# Patient Record
Sex: FEMALE | Race: WHITE | Employment: OTHER | URBAN - METROPOLITAN AREA
[De-identification: names, ages, dates, MRNs, and addresses within clinical notes are randomized per-mention and may not be internally consistent; named-entity substitution may affect disease eponyms.]

---

## 2017-06-17 DIAGNOSIS — E03.4 HYPOTHYROIDISM DUE TO ACQUIRED ATROPHY OF THYROID: ICD-10-CM

## 2017-06-19 RX ORDER — LEVOTHYROXINE SODIUM 112 UG/1
TABLET ORAL
Qty: 90 TAB | Refills: 0 | Status: SHIPPED | OUTPATIENT
Start: 2017-06-19 | End: 2017-09-07 | Stop reason: SDUPTHER

## 2017-06-19 NOTE — TELEPHONE ENCOUNTER
She is due for lab appt to check annual fasting labs now.   Annual visit with MD or BOBBY is due in August.  Done for 90 since going to mail order but the lab appt needs to be now

## 2017-09-07 DIAGNOSIS — E03.4 HYPOTHYROIDISM DUE TO ACQUIRED ATROPHY OF THYROID: ICD-10-CM

## 2017-09-11 RX ORDER — LEVOTHYROXINE SODIUM 112 UG/1
TABLET ORAL
Qty: 90 TAB | Refills: 0 | Status: SHIPPED | OUTPATIENT
Start: 2017-09-11 | End: 2017-11-30 | Stop reason: SDUPTHER

## 2017-10-04 ENCOUNTER — OFFICE VISIT (OUTPATIENT)
Dept: FAMILY MEDICINE CLINIC | Age: 62
End: 2017-10-04

## 2017-10-04 VITALS
BODY MASS INDEX: 24.53 KG/M2 | HEART RATE: 70 BPM | DIASTOLIC BLOOD PRESSURE: 76 MMHG | OXYGEN SATURATION: 99 % | TEMPERATURE: 96.5 F | HEIGHT: 66 IN | WEIGHT: 152.6 LBS | RESPIRATION RATE: 16 BRPM | SYSTOLIC BLOOD PRESSURE: 107 MMHG

## 2017-10-04 DIAGNOSIS — Z23 ENCOUNTER FOR IMMUNIZATION: ICD-10-CM

## 2017-10-04 DIAGNOSIS — Z00.00 PHYSICAL EXAM: Primary | ICD-10-CM

## 2017-10-04 DIAGNOSIS — E78.00 PURE HYPERCHOLESTEROLEMIA: ICD-10-CM

## 2017-10-04 DIAGNOSIS — M41.25 OTHER IDIOPATHIC SCOLIOSIS, THORACOLUMBAR REGION: ICD-10-CM

## 2017-10-04 DIAGNOSIS — M85.80 OSTEOPENIA, UNSPECIFIED LOCATION: ICD-10-CM

## 2017-10-04 DIAGNOSIS — E03.9 HYPOTHYROIDISM, UNSPECIFIED TYPE: ICD-10-CM

## 2017-10-04 DIAGNOSIS — Z00.00 LABORATORY EXAM ORDERED AS PART OF ROUTINE GENERAL MEDICAL EXAMINATION: ICD-10-CM

## 2017-10-04 RX ORDER — CHOLECALCIFEROL (VITAMIN D3) 125 MCG
CAPSULE ORAL DAILY
COMMUNITY

## 2017-10-04 NOTE — PROGRESS NOTES
Chief Complaint   Patient presents with    Complete Physical    Labs     Fasting     1. Have you been to the ER, urgent care clinic since your last visit? Hospitalized since your last visit? No    2. Have you seen or consulted any other health care providers outside of the [address] [place] [address] since your last visit? Include any pap smears or colon screening. No     I have reviewed Health Maintenance with the patient and updated. Advance Care Planning information reviewed and given to the patient. The patient was counseled on the dangers of tobacco use, and Patient is a non smoker. .  Reviewed strategies to maximize success, including Continue not to smoke. Complete Physical Exam Female  Pre-Visit Questions:    1. Do you follow a low fat or low salt diet ? y  2. Do you follow an exercise program? y  3. Have you had your tetanus booster in the last 10 years? n  4. Have you ever had a Pneumonia vaccine? n  5. Do you smoke? n  6. Do you consider yourself overweight? n  7. Do you perform Breast self exam?y  8. Is there a family history of CAD< age 48? n  5. Is there a family history of Cancer? y  8. Do you have any Cancer risks? n  11. Have you had a colonoscopy? y  15. Have you been to your eye doctor past year?   y  15. Have you been to your dentist in the last 6 months? y   15. Have you had your flu shot for this season?  n  13. Have you had a Pap smear in the last 3 years?n  16. Have you had your annual mammogram?n  17. Have you had a bone density scan(DEXA)? Had a baseline done.

## 2017-10-04 NOTE — MR AVS SNAPSHOT
Visit Information Date & Time Provider Department Dept. Phone Encounter #  
 10/4/2017  8:20 AM Ashlyn Ruano MD [facility] [phone] [id number] Follow-up Instructions Return in about 1 year (around 10/4/2018) for [facility], labs. Upcoming Health Maintenance Date Due DTaP/Tdap/Td series (1 - Tdap) 10/5/1974 FOBT Q 1 YEAR AGE 50-75 10/5/2003 BREAST CANCER SCRN MAMMOGRAM 1/2/2018* PAP AKA CERVICAL CYTOLOGY 1/2/2018* *Topic was postponed. The date shown is not the original due date. Allergies as of 10/4/2017  Review Complete On: 10/4/2017 By: Ashlyn Ruano MD  
 No Known Allergies Current Immunizations  Reviewed on 8/2/2016 Name Date Tdap  Incomplete Zoster Vaccine, Live 11/7/2015 Not reviewed this visit You Were Diagnosed With   
  
 Codes Comments Physical exam    -  Primary ICD-10-CM: Z00.00 ICD-9-CM: V70.9 Other idiopathic scoliosis, thoracolumbar region     ICD-10-CM: M41.25 ICD-9-CM: 737.30 Osteopenia, unspecified location     ICD-10-CM: M85.80 ICD-9-CM: 733.90 Hypothyroidism, unspecified type     ICD-10-CM: E03.9 ICD-9-CM: 244.9 Pure hypercholesterolemia     ICD-10-CM: E78.00 ICD-9-CM: 272.0 Encounter for immunization     ICD-10-CM: N20 ICD-9-CM: V03.89 Laboratory exam ordered as part of routine general medical examination     ICD-10-CM: Z00.00 ICD-9-CM: V72.62 Vitals BP Pulse Temp Resp Height(growth percentile) Weight(growth percentile) 107/76 (BP 1 Location: Left arm, BP Patient Position: Sitting) 70 96.5 °F (35.8 °C) (Oral) 16 5' 6.25\" (1.683 m) 152 lb 9.6 oz (69.2 kg) SpO2 BMI OB Status Smoking Status 99% 24.44 kg/m2 Postmenopausal Never Smoker Vitals History BMI and BSA Data Body Mass Index Body Surface Area  
 24.44 kg/m 2 1.8 m 2 Preferred Pharmacy Pharmacy Name Phone [facility], [address] [place] [address] [address] Josefina Kulkarni Your Updated Medication List  
  
   
This list is accurate as of: 10/4/17  9:24 AM.  Always use your most recent med list.  
  
  
  
  
 levothyroxine 112 mcg tablet Commonly known as:  SYNTHROID Take 1 tablet by mouth  daily for thyroid  replacement VITAMIN D3 2,000 unit Tab Generic drug:  cholecalciferol (vitamin D3) Take  by mouth daily. We Performed the Following AMB POC EKG ROUTINE W/ 12 LEADS, INTER & REP [52243 CPT(R)] CBC WITH AUTOMATED DIFF [31260 CPT(R)] LIPID PANEL [97313 CPT(R)] METABOLIC PANEL, COMPREHENSIVE [04348 CPT(R)] OCCULT BLOOD, IMMUNOASSAY (FIT) [id number] CPT(R)] NV IMMUNIZ ADMIN,1 SINGLE/COMB VAC/TOXOID [id number] CPT(R)] T4, FREE [id number] CPT(R)] TETANUS, DIPHTHERIA TOXOIDS AND ACELLULAR PERTUSSIS VACCINE (TDAP), IN INDIVIDS. >=7, IM [id number] CPT(R)] TSH 3RD GENERATION [05924 CPT(R)] URINALYSIS W/ RFLX MICROSCOPIC [40846 CPT(R)] VITAMIN D, 25 HYDROXY [id number] CPT(R)] Follow-up Instructions Return in about 1 year (around 10/4/2018) for [facility], [facility]. Introducing [facility]! Jaiden Edgar introduces [employer] patient portal. Now you can access parts of your medical record, email your doctor's office, and request medication refills online. 1. In your internet browser, go to [email or web address] 2. Click on the First Time User? Click Here link in the Sign In box. You will see the New Member Sign Up page. 3. Enter your [employer] Access Code exactly as it appears below. You will not need to use this code after youve completed the sign-up process. If you do not sign up before the expiration date, you must request a new code. · [employer] Access Code: [email or web address] Expires: 1/2/2018  9:21 AM 
 
4.  Enter the last four digits of your Social Security Number (xxxx) and Date of Birth (mm/dd/yyyy) as indicated and click Submit. You will be taken to the next sign-up page. 5. Create a Online Prasad ID. This will be your Online Prasad login ID and cannot be changed, so think of one that is secure and easy to remember. 6. Create a Online Prasad password. You can change your password at any time. 7. Enter your Password Reset Question and Answer. This can be used at a later time if you forget your password. 8. Enter your e-mail address. You will receive e-mail notification when new information is available in [address] 9. Click Sign Up. You can now view and download portions of your medical record. 10. Click the Download Summary menu link to download a portable copy of your medical information. If you have questions, please visit the Frequently Asked Questions section of the Online Prasad website. Remember, Online [employer] is NOT to be used for urgent needs. For medical emergencies, dial 911. Now available from your iPhone and Android! Please provide this summary of care documentation to your next provider. Your primary care clinician is listed as [id number] If you have any questions after today's visit, please call [phone].

## 2017-10-04 NOTE — LETTER
10/5/2017 1:11 PM 
 
Ms. Meg Bonilla [address] [phone] Dear Meg Bonilla: Please find your most recent results below. Resulted Orders VITAMIN D, 25 HYDROXY Result Value Ref Range VITAMIN D, 25-HYDROXY 38.4 30.0 - 100.0 ng/mL Comment:  
   Vitamin D deficiency has been defined by the [address] practice guideline as a 
level of serum 25-OH vitamin D less than 20 ng/mL (1,2). The Endocrine Society went on to further define vitamin D 
insufficiency as a level between 21 and 29 ng/mL (2). 1. IOM ([place] of Medicine). 2010. Dietary reference 
   intakes for calcium and D. [facility]: The 
   [employer]. 2. Brittani MF, Wen LEBLANC, Richard GOLDBERG, et al. 
   Evaluation, treatment, and prevention of vitamin D 
   deficiency: an Endocrine Society clinical practice 
   guideline. JCEM. 2011 Jul; 96(7):1911-30. Narrative Performed at:  [address]  [id number] : Taylor Watson MD, Phone:  [phone] CBC WITH AUTOMATED DIFF Result Value Ref Range WBC 5.7 3.4 - 10.8 x10E3/uL  
 RBC 4.25 3.77 - 5.28 x10E6/uL HGB 13.4 11.1 - 15.9 g/dL HCT 39.7 34.0 - 46.6 % MCV 93 79 - 97 fL  
 MCH 31.5 26.6 - 33.0 pg  
 MCHC 33.8 31.5 - 35.7 g/dL  
 RDW 13.4 12.3 - 15.4 % PLATELET 013 194 - 733 x10E3/uL NEUTROPHILS 61 Not Estab. % Lymphocytes 27 Not Estab. % MONOCYTES 8 Not Estab. % EOSINOPHILS 3 Not Estab. % BASOPHILS 1 Not Estab. %  
 ABS. NEUTROPHILS 3.5 1.4 - 7.0 x10E3/uL Abs Lymphocytes 1.6 0.7 - 3.1 x10E3/uL  
 ABS. MONOCYTES 0.4 0.1 - 0.9 x10E3/uL  
 ABS. EOSINOPHILS 0.2 0.0 - 0.4 x10E3/uL  
 ABS. BASOPHILS 0.0 0.0 - 0.2 x10E3/uL IMMATURE GRANULOCYTES 0 Not Estab. %  
 ABS. IMM. GRANS. 0.0 0.0 - 0.1 x10E3/uL Narrative Performed at:  [address]  [id number] : Epi Coon MD, Phone:  [phone] URINALYSIS W/ RFLX MICROSCOPIC Result Value Ref Range Specific Gravity 1.019 1.005 - 1.030  
 pH (UA) 7.0 5.0 - 7.5 Color Yellow Yellow Appearance Clear Clear Leukocyte Esterase Negative Negative Protein Negative Negative/Trace Glucose Negative Negative Ketone Negative Negative Blood 3+ (A) Negative Bilirubin Negative Negative Urobilinogen 0.2 0.2 - 1.0 mg/dL Nitrites Negative Negative Microscopic Examination See additional order Comment:  
   Microscopic was indicated and was performed. Narrative Performed at:  [address]  [id number] : Epi Coon MD, Phone:  [phone] TSH 3RD GENERATION Result Value Ref Range TSH 0.529 0.450 - 4.500 uIU/mL Narrative Performed at:  [address]  [id number] : Epi Coon MD, Phone:  [phone] METABOLIC PANEL, COMPREHENSIVE Result Value Ref Range Glucose 89 65 - 99 mg/dL BUN 15 8 - 27 mg/dL Creatinine 0.81 0.57 - 1.00 mg/dL GFR est non-AA 78 >59 mL/min/1.73 GFR est AA 89 >59 mL/min/1.73  
 BUN/Creatinine ratio 19 12 - 28 Sodium 141 134 - 144 mmol/L Potassium 4.6 3.5 - 5.2 mmol/L Chloride 101 96 - 106 mmol/L  
 CO2 27 18 - 29 mmol/L Calcium 9.7 8.7 - 10.3 mg/dL Protein, total 7.1 6.0 - 8.5 g/dL Albumin 4.4 3.6 - 4.8 g/dL GLOBULIN, TOTAL 2.7 1.5 - 4.5 g/dL A-G Ratio 1.6 1.2 - 2.2 Bilirubin, total 0.9 0.0 - 1.2 mg/dL Alk. phosphatase 68 39 - 117 IU/L  
 AST (SGOT) 22 0 - 40 IU/L  
 ALT (SGPT) 20 0 - 32 IU/L Narrative Performed at:  [address]  [id number] : Epi Coon MD, Phone:  [phone] LIPID PANEL Result Value Ref Range Cholesterol, total 248 (H) 100 - 199 mg/dL Triglyceride 137 0 - 149 mg/dL HDL Cholesterol 75 >39 mg/dL VLDL, calculated 27 5 - 40 mg/dL LDL, calculated 146 (H) 0 - 99 mg/dL Narrative Performed at:  [address]  [id number] : Lauro Yanes MD, Phone:  [phone] T4, FREE Result Value Ref Range T4, Free 1.76 0.82 - 1.77 ng/dL Narrative Performed at:  [address]  [id number] : Lauro Yanes MD, Phone:  [phone] MICROSCOPIC EXAMINATION Result Value Ref Range WBC 0-5 0 - 5 /hpf  
 RBC 11-30 (A) 0 - 2 /hpf Epithelial cells 0-10 0 - 10 /hpf Casts None seen None seen /lpf Mucus Present Not Estab. Bacteria Few None seen/Few Narrative Performed at:  [address]  [id number] : Lauro Yanes MD, Phone:  [phone] CVD REPORT Result Value Ref Range INTERPRETATION Note Comment:  
   Supplement report is available. Narrative Performed at:  [address] [place] [address]  [id number] : Lucina Emanuel PhD, Phone:  [phone] RECOMMENDATIONS: 
Lipids slightly improved. Recommend getting heart scan to assess for Coronary Artery Disease. Hematuria. If there is no irritation present, I recommend referral to urology for evaluation. Please call me if you have any questions: [phone] Sincerely, Ashlyn Ruano MD

## 2017-10-04 NOTE — PROGRESS NOTES
HISTORY OF PRESENT ILLNESS  Felix Watson is a 59 y.o. female. HPI   Here for [facility]. Eye doctor past yr. Dentist 2 x annually. Colonoscopy '07. Knows due 10 yr repeat. Prefers FIT testing. Mammo '15. Scoliosis worse. Losing height. Pt will set up for both at [facility]. Pap ? Mliss Mcnally No signif hx past yr. Walks 2 miles daily. Does back exercises daily. Has CPAP but doesn't use.  states doesn't stop breathing anymore. May do home test.    Patient Active Problem List   Diagnosis Code    Pure hypercholesterolemia E78.00    Osteopenia M85.80    Hypothyroid E03.9    Scoliosis M41.9    Chronic cough R05     Current Outpatient Prescriptions   Medication Sig Dispense Refill    cholecalciferol, vitamin D3, (VITAMIN D3) 2,000 unit tab Take  by mouth daily.       levothyroxine (SYNTHROID) 112 mcg tablet Take 1 tablet by mouth  daily for thyroid  replacement 90 Tab 0     No Known Allergies  Past Medical History:   Diagnosis Date    Adhesive capsulitis of left shoulder     per 1/12/16 note    Advance directive discussed with patient 04/18/2016    Back pain     due to curvature           11/16/15 PT notes    Bilateral bunions     Bronchitis 3/8/16    [facility] notes  ?early pneumonia    Left shoulder pain 12/15/15    [facility]  [facility] 1/12/16 f/u note    Osteopenia     Palpitation     Pneumonia [id number]    Scoliosis     sees chiro    Thyroid disease     dr locke    Vitamin D deficiency 4/16/16    rx done for 3 months     Past Surgical History:   Procedure Laterality Date    BREAST SURGERY PROCEDURE UNLISTED      left  breast fibroid    ENDOSCOPY, COLON, DIAGNOSTIC      10/01/07 repeat 10 gelrud    HX HEENT      tonsil , septoplasty     Family History   Problem Relation Age of Onset    Lung Disease Mother     Cancer Mother      lung cancer-heavy smoker    Lung Disease Father     Cancer Father      lung cancer--heavy smoker    Alcohol abuse Father     Alcohol abuse Brother     Diabetes Maternal Grandmother     Diabetes Maternal Grandfather     Lung Disease Paternal Grandmother     Diabetes Paternal Grandfather     Other Brother      polio    Thyroid Disease Sister      Social History   Substance Use Topics    Smoking status: Never Smoker    Smokeless tobacco: Never Used    Alcohol use No      Lab Results  Component Value Date/Time   WBC 6.5 04/18/2016 11:42 AM   HGB 14.3 04/18/2016 11:42 AM   HCT 42.8 04/18/2016 11:42 AM   PLATELET 957 42/64/2087 11:42 AM   MCV 94 04/18/2016 11:42 AM     Lab Results  Component Value Date/Time   Glucose 88 04/18/2016 11:42 AM   LDL, calculated 155 04/18/2016 11:42 AM   Creatinine 0.72 04/18/2016 11:42 AM      Lab Results  Component Value Date/Time   Cholesterol, total 262 04/18/2016 11:42 AM   HDL Cholesterol 84 04/18/2016 11:42 AM   LDL, calculated 155 04/18/2016 11:42 AM   Triglyceride 117 04/18/2016 11:42 AM   Lab Results  Component Value Date/Time   ALT (SGPT) 22 04/18/2016 11:42 AM   AST (SGOT) 23 04/18/2016 11:42 AM   Alk. phosphatase 72 04/18/2016 11:42 AM   Bilirubin, total 0.9 04/18/2016 11:42 AM   Albumin 4.6 04/18/2016 11:42 AM   Protein, total 7.3 04/18/2016 11:42 AM   PLATELET 105 91/00/2690 11:42 AM       Lab Results  Component Value Date/Time   GFR est non-AA 90 04/18/2016 11:42 AM   GFR est  04/18/2016 11:42 AM   Creatinine 0.72 04/18/2016 11:42 AM   BUN 16 04/18/2016 11:42 AM   Sodium 139 04/18/2016 11:42 AM   Potassium 4.4 04/18/2016 11:42 AM   Chloride 97 04/18/2016 11:42 AM   CO2 25 04/18/2016 11:42 AM   Lab Results  Component Value Date/Time   TSH 0.864 04/18/2016 11:42 AM   T4, Free 1.69 04/18/2016 11:42 AM      Lab Results   Component Value Date/Time    Glucose 88 04/18/2016 11:42 AM         Review of Systems   Constitutional: Negative. HENT: Negative. Eyes: Negative. Respiratory: Negative. Cardiovascular: Positive for palpitations. Gastrointestinal: Negative. Genitourinary: Negative.     Musculoskeletal: Positive for back pain. Skin: Negative. Neurological: Negative. Endo/Heme/Allergies: Negative. Psychiatric/Behavioral: Negative. Physical Exam   Vitals:    10/04/17 0844   BP: 107/76   Pulse: 70   Resp: 16   Temp: 96.5 °F (35.8 °C)   TempSrc: Oral   SpO2: 99%   Weight: 152 lb 9.6 oz (69.2 kg)   Height: 5' 6.25\" (1.683 m)       General    alert, cooperative, no distress, appears stated age   Head    Normocephalic, without obvious abnormality, atraumatic   Eyes    conjunctivae/corneas clear. PERRL. Fundi benign   Ears    Canals and TMs clear   Nose Passages patent. Mucosa normal. No drainage or sinus tenderness. Throat Lips, mucosa, and tongue normal. Teeth and gums normal.  Post pharynx neg. Neck supple, nontender, no adenopathy, thyroid: not enlarged, no masses/nodules, no carotid bruits   Back     assymmetric, mod thoracolumbar curvature. FROM. No CVA tenderness   Lungs     clear to auscultation bilaterally   Breasts    No masses, NT, No d/c, No axillary adenopathy   Heart    Regular rate and rhythm, with no murmur, click, rub or gallop   Abdomen   Soft, non-tender. Bowel sounds normal. No masses,  No organomegaly   Genitalia and Rectal  Deferred. Extremities   extremities normal, atraumatic, no cyanosis or edema   Pulses   2+ and symmetric   Skin No rashes or lesions       Neurologic Romberg neg, nml heel, toe and Tandem gait. DTRs 2+ symmetric         ASSESSMENT and PLAN    ICD-10-CM ICD-9-CM    1. Physical exam Z00.00 V70.9 AMB POC EKG ROUTINE W/ 12 LEADS, INTER & REP      VITAMIN D, 25 HYDROXY      CBC WITH AUTOMATED DIFF      URINALYSIS W/ RFLX MICROSCOPIC      TSH 3RD GENERATION      METABOLIC PANEL, COMPREHENSIVE      LIPID PANEL      OCCULT BLOOD, IMMUNOASSAY (FIT)   2. Other idiopathic scoliosis, thoracolumbar region M41.25 737.30    3. Osteopenia, unspecified location M85.80 733.90 VITAMIN D, 25 HYDROXY      METABOLIC PANEL, COMPREHENSIVE   4.  Hypothyroidism, unspecified type E03.9 244.9 TSH 3RD GENERATION      T4, FREE   5. Pure hypercholesterolemia E78.00 272.0 LIPID PANEL   6. Encounter for immunization Z23 V03.89 TETANUS, DIPHTHERIA TOXOIDS AND ACELLULAR PERTUSSIS VACCINE (TDAP), IN INDIVIDS. >=7, IM      AR IMMUNIZ ADMIN,1 SINGLE/COMB VAC/TOXOID   7. Laboratory exam ordered as part of routine general medical examination Z00.00 V72.62 VITAMIN D, 25 HYDROXY      CBC WITH AUTOMATED DIFF      URINALYSIS W/ RFLX MICROSCOPIC      TSH 3RD GENERATION      METABOLIC PANEL, COMPREHENSIVE      LIPID PANEL      OCCULT BLOOD, IMMUNOASSAY (FIT)     Follow-up Disposition:  Return in about 1 year (around 10/4/2018) for [facility], labs.

## 2017-10-05 LAB
25(OH)D3+25(OH)D2 SERPL-MCNC: 38.4 NG/ML (ref 30–100)
ALBUMIN SERPL-MCNC: 4.4 G/DL (ref 3.6–4.8)
ALBUMIN/GLOB SERPL: 1.6 {RATIO} (ref 1.2–2.2)
ALP SERPL-CCNC: 68 IU/L (ref 39–117)
ALT SERPL-CCNC: 20 IU/L (ref 0–32)
APPEARANCE UR: CLEAR
AST SERPL-CCNC: 22 IU/L (ref 0–40)
BACTERIA #/AREA URNS HPF: ABNORMAL /[HPF]
BASOPHILS # BLD AUTO: 0 X10E3/UL (ref 0–0.2)
BASOPHILS NFR BLD AUTO: 1 %
BILIRUB SERPL-MCNC: 0.9 MG/DL (ref 0–1.2)
BILIRUB UR QL STRIP: NEGATIVE
BUN SERPL-MCNC: 15 MG/DL (ref 8–27)
BUN/CREAT SERPL: 19 (ref 12–28)
CALCIUM SERPL-MCNC: 9.7 MG/DL (ref 8.7–10.3)
CASTS URNS QL MICRO: ABNORMAL /LPF
CHLORIDE SERPL-SCNC: 101 MMOL/L (ref 96–106)
CHOLEST SERPL-MCNC: 248 MG/DL (ref 100–199)
CO2 SERPL-SCNC: 27 MMOL/L (ref 18–29)
COLOR UR: YELLOW
CREAT SERPL-MCNC: 0.81 MG/DL (ref 0.57–1)
EOSINOPHIL # BLD AUTO: 0.2 X10E3/UL (ref 0–0.4)
EOSINOPHIL NFR BLD AUTO: 3 %
EPI CELLS #/AREA URNS HPF: ABNORMAL /HPF
ERYTHROCYTE [DISTWIDTH] IN BLOOD BY AUTOMATED COUNT: 13.4 % (ref 12.3–15.4)
GLOBULIN SER CALC-MCNC: 2.7 G/DL (ref 1.5–4.5)
GLUCOSE SERPL-MCNC: 89 MG/DL (ref 65–99)
GLUCOSE UR QL: NEGATIVE
HCT VFR BLD AUTO: 39.7 % (ref 34–46.6)
HDLC SERPL-MCNC: 75 MG/DL
HGB BLD-MCNC: 13.4 G/DL (ref 11.1–15.9)
HGB UR QL STRIP: ABNORMAL
IMM GRANULOCYTES # BLD: 0 X10E3/UL (ref 0–0.1)
IMM GRANULOCYTES NFR BLD: 0 %
INTERPRETATION, 910389: NORMAL
KETONES UR QL STRIP: NEGATIVE
LDLC SERPL CALC-MCNC: 146 MG/DL (ref 0–99)
LEUKOCYTE ESTERASE UR QL STRIP: NEGATIVE
LYMPHOCYTES # BLD AUTO: 1.6 X10E3/UL (ref 0.7–3.1)
LYMPHOCYTES NFR BLD AUTO: 27 %
MCH RBC QN AUTO: 31.5 PG (ref 26.6–33)
MCHC RBC AUTO-ENTMCNC: 33.8 G/DL (ref 31.5–35.7)
MCV RBC AUTO: 93 FL (ref 79–97)
MICRO URNS: ABNORMAL
MONOCYTES # BLD AUTO: 0.4 X10E3/UL (ref 0.1–0.9)
MONOCYTES NFR BLD AUTO: 8 %
MUCOUS THREADS URNS QL MICRO: PRESENT
NEUTROPHILS # BLD AUTO: 3.5 X10E3/UL (ref 1.4–7)
NEUTROPHILS NFR BLD AUTO: 61 %
NITRITE UR QL STRIP: NEGATIVE
PH UR STRIP: 7 [PH] (ref 5–7.5)
PLATELET # BLD AUTO: 270 X10E3/UL (ref 150–379)
POTASSIUM SERPL-SCNC: 4.6 MMOL/L (ref 3.5–5.2)
PROT SERPL-MCNC: 7.1 G/DL (ref 6–8.5)
PROT UR QL STRIP: NEGATIVE
RBC # BLD AUTO: 4.25 X10E6/UL (ref 3.77–5.28)
RBC #/AREA URNS HPF: ABNORMAL /HPF
SODIUM SERPL-SCNC: 141 MMOL/L (ref 134–144)
SP GR UR: 1.02 (ref 1–1.03)
T4 FREE SERPL-MCNC: 1.76 NG/DL (ref 0.82–1.77)
TRIGL SERPL-MCNC: 137 MG/DL (ref 0–149)
TSH SERPL DL<=0.005 MIU/L-ACNC: 0.53 UIU/ML (ref 0.45–4.5)
UROBILINOGEN UR STRIP-MCNC: 0.2 MG/DL (ref 0.2–1)
VLDLC SERPL CALC-MCNC: 27 MG/DL (ref 5–40)
WBC # BLD AUTO: 5.7 X10E3/UL (ref 3.4–10.8)
WBC #/AREA URNS HPF: ABNORMAL /HPF

## 2017-10-05 NOTE — PROGRESS NOTES
Letter sent to patient with recommendations inserted per Dr. Soni Saliva: Lipids slightly improved. Recommend getting heart scan to assess Coronary Artery Disease. Hematuria.  If no irritation I recommend referral to urology for evaluation

## 2017-10-05 NOTE — PROGRESS NOTES
Lipids slt improved. Rec get heart scan to assess CAD risk. Hematuria.   If no irritation rec ref to [facility] for eval.

## 2017-10-25 DIAGNOSIS — M81.0 POSTMENOPAUSAL BONE LOSS: ICD-10-CM

## 2017-10-25 DIAGNOSIS — M85.80 OSTEOPENIA, UNSPECIFIED LOCATION: Primary | ICD-10-CM

## 2017-10-25 DIAGNOSIS — Z12.31 ENCOUNTER FOR SCREENING MAMMOGRAM FOR BREAST CANCER: ICD-10-CM

## 2017-10-26 LAB — HEMOCCULT STL QL IA: NEGATIVE

## 2017-11-01 NOTE — PROGRESS NOTES
Spoke with pt about her results per Dr. Shazia Kothari, \"Neg stool test.  Repeat 1 yr\". Pt verbalized understanding with no further questions.

## 2017-11-09 ENCOUNTER — HOSPITAL ENCOUNTER (OUTPATIENT)
Dept: MAMMOGRAPHY | Age: 62
Discharge: HOME | End: 2017-11-09
Attending: FAMILY MEDICINE
Payer: COMMERCIAL

## 2017-11-09 DIAGNOSIS — Z12.31 ENCOUNTER FOR SCREENING MAMMOGRAM FOR BREAST CANCER: ICD-10-CM

## 2017-11-09 DIAGNOSIS — M81.0 POSTMENOPAUSAL BONE LOSS: ICD-10-CM

## 2017-11-09 DIAGNOSIS — M85.80 OSTEOPENIA, UNSPECIFIED LOCATION: ICD-10-CM

## 2017-11-09 PROCEDURE — 77080 DXA BONE DENSITY AXIAL: CPT

## 2017-11-09 PROCEDURE — 77067 SCR MAMMO BI INCL CAD: CPT

## 2018-01-30 ENCOUNTER — TELEPHONE (OUTPATIENT)
Dept: FAMILY MEDICINE CLINIC | Age: 63
End: 2018-01-30

## 2018-01-30 NOTE — TELEPHONE ENCOUNTER
Patient calling to request a referral to see a Physical Therapist. His name is Kapil Rondon of [facility] . [address] , [place], [address] [place] [address].

## 2018-02-20 DIAGNOSIS — M54.9 CHRONIC BACK PAIN, UNSPECIFIED BACK LOCATION, UNSPECIFIED BACK PAIN LATERALITY: Primary | ICD-10-CM

## 2018-02-20 DIAGNOSIS — G89.29 CHRONIC BACK PAIN, UNSPECIFIED BACK LOCATION, UNSPECIFIED BACK PAIN LATERALITY: Primary | ICD-10-CM

## 2018-06-04 ENCOUNTER — OFFICE VISIT (OUTPATIENT)
Dept: FAMILY MEDICINE CLINIC | Age: 63
End: 2018-06-04

## 2018-06-04 VITALS
RESPIRATION RATE: 18 BRPM | DIASTOLIC BLOOD PRESSURE: 69 MMHG | TEMPERATURE: 96.8 F | OXYGEN SATURATION: 99 % | HEART RATE: 65 BPM | HEIGHT: 66 IN | SYSTOLIC BLOOD PRESSURE: 119 MMHG | WEIGHT: 153.9 LBS | BODY MASS INDEX: 24.73 KG/M2

## 2018-06-04 DIAGNOSIS — H93.13 TINNITUS OF BOTH EARS: Primary | ICD-10-CM

## 2018-06-04 DIAGNOSIS — B07.9 VIRAL WARTS, UNSPECIFIED TYPE: ICD-10-CM

## 2018-06-04 NOTE — PATIENT INSTRUCTIONS
1) Referral for podiatry:  Dr. Kenneth Fisher DPM  Address: [facility], [facility], [address] [facility]  Phone: [phone]    2) You have been referred to Dr. More Said, Ears, Nose and Throat (ENT) specialist.  Please call his office and schedule an appointment. [address] [place] [address]  Ph: [phone]    3) Osteopenia, (thinning of the bones) is not as severe as osteoporosis (bone loss). However, to reduce risk of progression to osteoporosis, calcium and Vitamin D supplements as well as daily weight bearing exercise (30-60 minutes/day) are recommended. Please start taking Calcium (1,200mg) and Vitamin D - (1,000 international units (IU) supplements daily. You may take individual supplements or a combination supplement as long as you will be getting the required daily amount. (You may have to take some supplements two times a day to get the appropriate amount.)  The goal is to get 1,000IU Vitamin D and 1,200mg Calcium a day. Vitamin D3 (Cholecalciferol) is best for Vitamin D supplementation. Weight bearing exercises are recommended to help stimulate bone formation as well. Weight bearing exercises include activities such as walking, tennis, aerobics, dancing and hiking. Do not smoke, as this contributes to bone loss. Additionally, alcohol has negative effects on bone health, so stop or limit alcohol consumption. Regular dental visits are also important as dental health can be impacted by bone loss. We will retest your bone density in 2-3 years.

## 2018-06-04 NOTE — PROGRESS NOTES
S: Gabriel Liang is a 57 y.o. female who presents for skin lesion    Assessment/Plan:  1. Tinnitus of both ears  -referral to ENT - Dr. Jamie Zimmer    2. Left foot lesions  -mid forefoot = 2mm hardened nodule w/white center  -mid heel = 1cm circular nodule w/hypopigmentation  -referral to poditry    3. Osteoporosis  -discussed Dexa scan results and pro/con of bisphosphonate medications   -pt has declined to get fosamax d/t SE she read about         HPI:    Bone health  1000 Ca + D3 2000 units daily  Didn't want to get fosamax last year dt SE - francisco jaw necrosis  Explained Dexa results and purpose of medications    Left foot   Mid foot - plantar wart  Mid heel - hardened callus    Ringing in ears - constant for past month  No dizziness  No vision changes or HA   No balance issues  No illnesses, but did think she might have had allergies -  currently not feeling well    Social History:  Nutrition: overall very healthy. Gets a lot of calcium (Ca calculator given)  Physical: very active - hikes, walks  Social: lives with   Occupation: self employed  Tobacco: none    Review of Systems:  - Constitutional Symptoms: no fatigue  - Cardiovascular: no chest pain or palpitations  - Respiratory: no cough or shortness of breath  - Gastrointestinal: no dysphagia or abdominal pain  - Musculoskeletal: no joint pains or weakness  - Neurological: no numbness, tingling    No LMP recorded.  Patient is postmenopausal.  Birth Control:     I reviewed the following:  Past Medical History:   Diagnosis Date    Adhesive capsulitis of left shoulder     per 1/12/16 note    Advance directive discussed with patient 04/18/2016    Back pain     due to curvature           11/16/15 PT notes    Bilateral bunions     Bronchitis 3/8/16    [facility] notes  ?early pneumonia    Left shoulder pain 12/15/15    Advanced  Herring 1/12/16 f/u note    Osteopenia     Palpitation     Pneumonia [id number]    Scoliosis     sees chiro    Thyroid disease     dr locke    Vitamin D deficiency 4/16/16    rx done for 3 months       Current Outpatient Prescriptions   Medication Sig Dispense Refill    levothyroxine (SYNTHROID) 112 mcg tablet TAKE 1 TABLET BY MOUTH  DAILY FOR THYROID  REPLACEMENT 90 Tab 3    cholecalciferol, vitamin D3, (VITAMIN D3) 2,000 unit tab Take  by mouth daily. No Known Allergies      O: VS:   Visit Vitals    /69 (BP 1 Location: Left arm, BP Patient Position: Sitting)    Pulse 65    Temp 96.8 °F (36 °C) (Oral)    Resp 18    Ht 5' 6.25\" (1.683 m)    Wt 153 lb 14.4 oz (69.8 kg)    SpO2 99%    BMI 24.65 kg/m2       GENERAL: Brea Norwood is in no acute distress. Non-toxic. Well nourished. Well developed. Appropriately groomed. HEAD:  Normocephalic. Atraumatic. Non tender sinuses x 4. EYE: PERRLA. EOMs intact. Sclera anicteric without injection. No drainage or discharge. EARS: Hearing intact bilaterally. External ear canals normal without evidence of blood or swelling. Bilateral TM's intact, pearly grey with landmarks visible. No erythema or effusion. NOSE: Patent. Nasal turbinates pink. No polyps noted. No erythema. No discharge. MOUTH: mucous membranes pink and moist. Posterior pharynx normal with cobblestone appearance. No erythema, white exudate or obstruction. Tonsils absent. NECK: supple. Midline trachea. No cervical adenopathy noted. RESP: Breath sounds are symmetrical bilaterally. Unlabored without SOB. Speaking in full sentences. Clear to auscultation bilaterally anteriorly and posteriorly. No wheezes. No rales or rhonchi. CV: normal rate. Regular rhythm. S1, S2 audible. No murmur noted. No rubs, clicks or gallops noted. ABDOMEN: Flat without bulges or pulsations. Soft and nondistended. No tenderness on palpation. No masses or organomegaly. No rebound, rigidity or guarding. Bowel sounds normal x 4 quadrants. SKIN: Skin is warm and dry. Turgor is normal. No cyanosis.  No jaundice or pallor. Left foot: mid forefoot = 2mm hardened nodule w/white center  Mid heel = 1cm circular nodule w/hypopigmentation  Lesion: No edema, erythema or purulent drainage noted. No induration or fluctuance noted. No adjacent lymph node involvement noted. PSYCH: appropriate behavior, dress and thought processes. Good eye contact. Clear and coherent speech. Full affect. Good insight.   ______________________________________________________________________  Patient education was done. Advised on nutrition, tobacco, and alcohol. Counseling included discussion of diagnosis, differentials, treatment options, prescribed treatment, warning signs and follow up. Medication risks/benefits, interactions and alternatives discussed with patient.      Patient verbalized understanding and agreed to plan of care.     Follow up with podiatry for warts and 1 1/2 yr for Dexa

## 2018-06-04 NOTE — PROGRESS NOTES
Chief Complaint   Patient presents with    Warts     lefty foot     Ringing in Ear     x 1 months       Vasquez Primes  Identified pt with two pt identifiers(name and ). Chief Complaint   Patient presents with    Warts     lefty foot     Ringing in Ear     x 1 months       1. Have you been to the ER, urgent care clinic since your last visit? N   Hospitalized since your last visit? n      2. Have you seen or consulted any other health care providers outside of the [address] [place] [address] since your last visit? Include any pap smears or colon screening. Yes/No    Today's provider has been notified of reason for visit, vitals and flowsheets obtained on patients.      Patient received paperwork for advance directive during previous visit but has not completed at this time     Reviewed record In preparation for visit, huddled with provider and have obtained necessary documentation      Health Maintenance Due   Topic    PAP AKA CERVICAL CYTOLOGY        Wt Readings from Last 3 Encounters:   18 153 lb 14.4 oz (69.8 kg)   10/04/17 152 lb 9.6 oz (69.2 kg)   16 160 lb (72.6 kg)     Temp Readings from Last 3 Encounters:   18 96.8 °F (36 °C) (Oral)   10/04/17 96.5 °F (35.8 °C) (Oral)   16 97.5 °F (36.4 °C) (Oral)     BP Readings from Last 3 Encounters:   18 119/69   10/04/17 107/76   16 98/64     Pulse Readings from Last 3 Encounters:   18 65   10/04/17 70   16 (!) 117     Vitals:    18 1633   BP: 119/69   Pulse: 65   Resp: 18   Temp: 96.8 °F (36 °C)   TempSrc: Oral   SpO2: 99%   Weight: 153 lb 14.4 oz (69.8 kg)   Height: 5' 6.25\" (1.683 m)         Learning Assessment:  :     Learning Assessment 2014   PRIMARY LEARNER Patient   HIGHEST LEVEL OF EDUCATION - PRIMARY LEARNER  > 4 YEARS OF COLLEGE   BARRIERS PRIMARY LEARNER NONE   CO-LEARNER CAREGIVER No   PRIMARY LANGUAGE ENGLISH   LEARNER PREFERENCE PRIMARY DEMONSTRATION   ANSWERED BY patient   RELATIONSHIP SELF       Depression Screening:  :     PHQ over the last two weeks 6/4/2018   Little interest or pleasure in doing things Not at all   Feeling down, depressed or hopeless Not at all   Total Score PHQ 2 0       Fall Risk Assessment:  :     No flowsheet data found. Abuse Screening:  :     No flowsheet data found. ADL Screening:  :     ADL Assessment 6/4/2018   Feeding yourself No Help Needed   Getting from bed to chair No Help Needed   Getting dressed No Help Needed   Bathing or showering No Help Needed   Walk across the room (includes cane/walker) No Help Needed   Using the telphone No Help Needed   Taking your medications No Help Needed   Preparing meals No Help Needed   Managing money (expenses/bills) No Help Needed   Moderately strenuous housework (laundry) No Help Needed   Shopping for personal items (toiletries/medicines) No Help Needed   Shopping for groceries No Help Needed   Driving No Help Needed   Climbing a flight of stairs No Help Needed   Getting to places beyond walking distances No Help Needed                 Medication reconciliation up to date and corrected with patient at this time.

## 2018-06-04 NOTE — MR AVS SNAPSHOT
[address] [place] 
[address] [facility] [address] 
[phone] Patient: Lazaro Muñoz MRN: [id number] Visit Information Date & Time Provider Department Dept. Phone Encounter #  
 6/4/2018  4:20 PM [place] IVAN Khan [facility] [phone] [id number] Upcoming Health Maintenance Date Due  
 PAP AKA CERVICAL CYTOLOGY 8/1/2015 Influenza Age 5 to Adult 8/1/2018 FOBT Q 1 YEAR AGE 50-75 10/19/2018 BREAST CANCER SCRN MAMMOGRAM 11/9/2019 DTaP/Tdap/Td series (2 - Td) 10/4/2027 Allergies as of 6/4/2018  Review Complete On: 6/4/2018 By: Gerald Ospina LPN No Known Allergies Current Immunizations  Reviewed on 10/4/2017 Name Date Tdap 10/4/2017 Zoster Vaccine, Live 11/7/2015 Not reviewed this visit You Were Diagnosed With   
  
 Codes Comments Tinnitus of both ears    -  Primary ICD-10-CM: H93.13 
ICD-9-CM: 388.30 Viral warts, unspecified type     ICD-10-CM: B07.9 ICD-9-CM: 078.10 Vitals BP Pulse Temp Resp Height(growth percentile) Weight(growth percentile)  
 119/69 (BP 1 Location: Left arm, BP Patient Position: Sitting) 65 96.8 °F (36 °C) (Oral) 18 5' 6.25\" (1.683 m) 153 lb 14.4 oz (69.8 kg) SpO2 BMI OB Status Smoking Status 99% 24.65 kg/m2 Postmenopausal Never Smoker BMI and BSA Data Body Mass Index Body Surface Area  
 24.65 kg/m 2 1.81 m 2 Preferred Pharmacy Pharmacy Name Phone 305 [facility], [address] [facility] [address] [place] Your Updated Medication List  
  
   
This list is accurate as of 6/4/18  5:14 PM.  Always use your most recent med list.  
  
  
  
  
 levothyroxine 112 mcg tablet Commonly known as:  SYNTHROID  
TAKE 1 TABLET BY MOUTH  DAILY FOR THYROID  REPLACEMENT  
  
 VITAMIN D3 2,000 unit Tab Generic drug:  cholecalciferol (vitamin D3) Take  by mouth daily. We Performed the Following REFERRAL TO ENT-OTOLARYNGOLOGY [IEP35 Custom] REFERRAL TO PODIATRY [REF90 Custom] Referral Information Referral ID Referred By Referred To  
  
 [id number] MD Caleb Duncancholo [address] [place] [address] Phone: [phone] Fax: [phone] Visits Status Start Date End Date 1 New Request 6/4/18 6/4/19 If your referral has a status of pending review or denied, additional information will be sent to support the outcome of this decision. Referral ID Referred By Referred To  
 [id number] Fernando Euceda, [address] Phone: [phone] Fax: [phone] Visits Status Start Date End Date 1 New Request 6/4/18 6/4/19 If your referral has a status of pending review or denied, additional information will be sent to support the outcome of this decision. Patient Instructions 1) Referral for podiatry: 
Dr. Emma Escobedo, [facility] 
Address: [address] [place] [address] Phone: [phone] 
 
2) You have been referred to Dr. Martinez Nose, Ears, Nose and Throat (ENT) specialist.  Please call his office and schedule an appointment. Caleb Vo [phone] Ph: [phone] 
 
3) Osteopenia, (thinning of the bones) is not as severe as osteoporosis (bone loss). However, to reduce risk of progression to osteoporosis, calcium and Vitamin D supplements as well as daily weight bearing exercise (30-60 minutes/day) are recommended. Please start taking Calcium (1,200mg) and Vitamin D - (1,000 international units (IU) supplements daily. You may take individual supplements or a combination supplement as long as you will be getting the required daily amount.   (You may have to take some supplements two times a day to get the appropriate amount.)  The goal is to get 1,000IU Vitamin D and 1,200mg Calcium a day. Vitamin D3 (Cholecalciferol) is best for Vitamin D supplementation. Weight bearing exercises are recommended to help stimulate bone formation as well. Weight bearing exercises include activities such as walking, tennis, aerobics, dancing and hiking. Do not smoke, as this contributes to bone loss. Additionally, alcohol has negative effects on bone health, so stop or limit alcohol consumption. Regular dental visits are also important as dental health can be impacted by bone loss. We will retest your bone density in 2-3 years. Introducing [facility] & [facility]! [facility] introduces [employer] patient portal. Now you can access parts of your medical record, email your doctor's office, and request medication refills online. 1. In your internet browser, go to [email or web address] 2. Click on the First Time User? Click Here link in the Sign In box. You will see the New Member Sign Up page. 3. Enter your [employer] Access Code exactly as it appears below. You will not need to use this code after youve completed the sign-up process. If you do not sign up before the expiration date, you must request a new code. · [employer] Access Code: [email or web address] Expires: 9/2/2018  5:14 PM 
 
4. Enter the last four digits of your Social Security Number (xxxx) and Date of Birth (mm/dd/yyyy) as indicated and click Submit. You will be taken to the next sign-up page. 5. Create a [employer] ID. This will be your [employer] login ID and cannot be changed, so think of one that is secure and easy to remember. 6. Create a [employer] password. You can change your password at any time. 7. Enter your Password Reset Question and Answer. This can be used at a later time if you forget your password. 8. Enter your e-mail address. You will receive e-mail notification when new information is available in [address] 9. Click Sign Up. You can now view and download portions of your medical record. 10. Click the Download Summary menu link to download a portable copy of your medical information. If you have questions, please visit the Frequently Asked Questions section of the [employer] website. Remember, [employer] is NOT to be used for urgent needs. For medical emergencies, dial 911. Now available from your iPhone and Android! Please provide this summary of care documentation to your next provider. Your primary care clinician is listed as Sergio Patiño Dr. If you have any questions after today's visit, please call [phone].

## 2018-07-12 ENCOUNTER — OFFICE VISIT (OUTPATIENT)
Dept: FAMILY MEDICINE CLINIC | Age: 63
End: 2018-07-12

## 2018-07-12 VITALS
WEIGHT: 153 LBS | OXYGEN SATURATION: 98 % | SYSTOLIC BLOOD PRESSURE: 102 MMHG | DIASTOLIC BLOOD PRESSURE: 64 MMHG | HEART RATE: 67 BPM | RESPIRATION RATE: 19 BRPM | HEIGHT: 66 IN | BODY MASS INDEX: 24.59 KG/M2 | TEMPERATURE: 98.1 F

## 2018-07-12 DIAGNOSIS — R22.1 SENSATION OF LUMP IN THROAT: Primary | ICD-10-CM

## 2018-07-12 NOTE — MR AVS SNAPSHOT
Oscar Mata 
 
 
 [address] [facility] 
[address] [place] [address] 
[phone] Patient: Jason Waters MRN: [id number] QA:65/4/[id number] Visit Information Date & Time Provider Department Dept. Phone Encounter #  
 7/12/2018  3:00 PM Nichelle Kamara NP [facility] [phone] [id number] Follow-up Instructions Return if symptoms worsen or fail to improve. Upcoming Health Maintenance Date Due  
 PAP AKA CERVICAL CYTOLOGY 8/1/2015 Influenza Age 5 to Adult 8/1/2018 FOBT Q 1 YEAR AGE 50-75 10/19/2018 BREAST CANCER SCRN MAMMOGRAM 11/9/2019 DTaP/Tdap/Td series (2 - Td) 10/4/2027 Allergies as of 7/12/2018  Review Complete On: 7/12/2018 By: Gunner Denis LPN No Known Allergies Current Immunizations  Reviewed on 10/4/2017 Name Date Tdap 10/4/2017 Zoster Vaccine, Live 11/7/2015 Not reviewed this visit You Were Diagnosed With   
  
 Codes Comments Sensation of lump in throat    -  Primary ICD-10-CM: R22.1 ICD-9-CM: 784.99 Vitals BP Pulse Temp Resp Height(growth percentile) Weight(growth percentile) 102/64 (BP 1 Location: Left arm, BP Patient Position: Sitting) 67 98.1 °F (36.7 °C) (Oral) 19 5' 6.25\" (1.683 m) 153 lb (69.4 kg) SpO2 BMI OB Status Smoking Status 98% 24.51 kg/m2 Postmenopausal Never Smoker BMI and BSA Data Body Mass Index Body Surface Area 24.51 kg/m 2 1.8 m 2 Preferred Pharmacy Pharmacy Name Phone 305 [facility], [address] [facility] [address] Your Updated Medication List  
  
   
This list is accurate as of 7/12/18  4:05 PM.  Always use your most recent med list.  
  
  
  
  
 levothyroxine 112 mcg tablet Commonly known as:  SYNTHROID  
TAKE 1 TABLET BY MOUTH  DAILY FOR THYROID  REPLACEMENT  
  
 VITAMIN D3 2,000 unit Tab Generic drug:  cholecalciferol (vitamin D3) Take  by mouth daily. We Performed the Following REFERRAL TO GASTROENTEROLOGY [IHE36 Custom] Comments:  
 Please evaluate and treat for persistent throat soreness and lump sensation. ENT already evaluated. Thanks. Follow-up Instructions Return if symptoms worsen or fail to improve. Referral Information Referral ID Referred By Referred To  
  
 [id number] LYLE, [address]   
   [address] [address], [address] Visits Status Start Date End Date 1 New Request 7/12/18 7/12/19 If your referral has a status of pending review or denied, additional information will be sent to support the outcome of this decision. Patient Instructions Throat soreness - referral to GI Please make an appt with Dr. Cari Navarro. 1) For ringing in the ears, can try to dry up the sinus passages. Use an OTC decongestant nasal spray such as Flonase, Nasonex, or Rhinocort. These contain a steroid and will help reduce congestion. You can spray 2x in each nostril two times a day. Use the opposite hand of the nostril you are spraying and look down at your toes when you administer the nasal spray. This ensures the spray is applied to the nasal tissue properly. 2) One type of way to screen for colon cancer is to test the DNA of stool that looks for blood and certain gene changes sometimes found in colorectal cancer cell. Currently, the FDA-approved test is Cologuard®. A healthcare provider writes a prescription for the test and a testing kit is then mailed to your home. After providing a stool sample as directed, the patient mails the kit to the laboratory for testing. A stool DNA test may appeal to people who want to be screened, but dont want to undergo the usual preparation required for a colonoscopy and some other screening tests.   It is a non-invasive test to check for colorectal cancer and, if negative, is repeated every 3 years. No special diet or bowel preparation (no laxatives or enemas) is required for a stool DNA test. However, if the test does show a possible cancer or pre-cancer, the patient would then need a colonoscopy to confirm it, and possibly to remove any polyps. Not everybody can have this type of screening test.  It is only for people with an average risk for colorectal cancer. (If you have a personal history of pre-cancerous polyps, colorectal cancer or other risk factors, this test is not right for you.) 3) Dr. Sully Guzman  
[address] [place] [address] [address], [place], [address] [place] [address] Phone: [phone] Introducing [facility] & HEALTH SERVICES! Geo Espinoza introduces [email or web address] patient portal. Now you can access parts of your medical record, email your doctor's office, and request medication refills online. 1. In your internet browser, go to [email or web address] 2. Click on the First Time User? Click Here link in the Sign In box. You will see the New Member Sign Up page. 3. Enter your [email or web address] Access Code exactly as it appears below. You will not need to use this code after youve completed the sign-up process. If you do not sign up before the expiration date, you must request a new code. · [email or web address] Access Code: [email or web address] Expires: 9/2/2018  5:14 PM 
 
4. Enter the last four digits of your Social Security Number (xxxx) and Date of Birth (mm/dd/yyyy) as indicated and click Submit. You will be taken to the next sign-up page. 5. Create a [employer] ID. This will be your [email or web address] login ID and cannot be changed, so think of one that is secure and easy to remember. 6. Create a [employer] password. You can change your password at any time. 7. Enter your Password Reset Question and Answer. This can be used at a later time if you forget your password. 8. Enter your e-mail address.  You will receive e-mail notification when new information is available in [employer]. 9. Click Sign Up. You can now view and download portions of your medical record. 10. Click the Download Summary menu link to download a portable copy of your medical information. If you have questions, please visit the Frequently Asked Questions section of the [employer] website. Remember, [employer] is NOT to be used for urgent needs. For medical emergencies, dial 911. Now available from your iPhone and Android! Please provide this summary of care documentation to your next provider. Your primary care clinician is listed as [phone] If you have any questions after today's visit, please call [phone].

## 2018-07-12 NOTE — PROGRESS NOTES
Chief Complaint   Patient presents with    Follow-up     ENT referral for throat issue/discomfort remains       Michelle Gutierrez  Identified pt with two pt identifiers(name and ). Chief Complaint   Patient presents with    Follow-up     ENT referral for throat issue/discomfort remains       1. Have you been to the ER, urgent care clinic since your last visit?n  Hospitalized since your last visit? No    2. Have you seen or consulted any other health care providers outside of the [address] [place] [address] since your last visit?n  Include any pap smears or colon screening. No    Today's provider has been notified of reason for visit, vitals and flowsheets obtained on patients.        Reviewed record In preparation for visit, huddled with provider and have obtained necessary documentation      Health Maintenance Due   Topic    PAP AKA CERVICAL CYTOLOGY        Wt Readings from Last 3 Encounters:   18 153 lb (69.4 kg)   18 153 lb 14.4 oz (69.8 kg)   10/04/17 152 lb 9.6 oz (69.2 kg)     Temp Readings from Last 3 Encounters:   18 98.1 °F (36.7 °C) (Oral)   18 96.8 °F (36 °C) (Oral)   10/04/17 96.5 °F (35.8 °C) (Oral)     BP Readings from Last 3 Encounters:   18 102/64   18 119/69   10/04/17 107/76     Pulse Readings from Last 3 Encounters:   18 67   18 65   10/04/17 70     Vitals:    18 1515   BP: 102/64   Pulse: 67   Resp: 19   Temp: 98.1 °F (36.7 °C)   TempSrc: Oral   SpO2: 98%   Weight: 153 lb (69.4 kg)   Height: 5' 6.25\" (1.683 m)   PainSc:   0 - No pain         Learning Assessment:  :     Learning Assessment 2014   PRIMARY LEARNER Patient   HIGHEST LEVEL OF EDUCATION - PRIMARY LEARNER  > 4 YEARS OF COLLEGE   BARRIERS PRIMARY LEARNER NONE   CO-LEARNER CAREGIVER No   PRIMARY LANGUAGE ENGLISH   LEARNER PREFERENCE PRIMARY DEMONSTRATION   ANSWERED BY patient   RELATIONSHIP SELF       Depression Screening:  :     PHQ over the last two weeks 2018 Little interest or pleasure in doing things Not at all   Feeling down, depressed or hopeless Not at all   Total Score PHQ 2 0       Fall Risk Assessment:  :     No flowsheet data found. Abuse Screening:  :     No flowsheet data found. ADL Screening:  :     ADL Assessment 6/4/2018   Feeding yourself No Help Needed   Getting from bed to chair No Help Needed   Getting dressed No Help Needed   Bathing or showering No Help Needed   Walk across the room (includes cane/walker) No Help Needed   Using the telphone No Help Needed   Taking your medications No Help Needed   Preparing meals No Help Needed   Managing money (expenses/bills) No Help Needed   Moderately strenuous housework (laundry) No Help Needed   Shopping for personal items (toiletries/medicines) No Help Needed   Shopping for groceries No Help Needed   Driving No Help Needed   Climbing a flight of stairs No Help Needed   Getting to places beyond walking distances No Help Needed                 Medication reconciliation up to date and corrected with patient at this time.

## 2018-07-12 NOTE — PROGRESS NOTES
S: Meg Bonilla is a 57 y.o. female who presents for follow up fullness in throat    Assessment/Plan:   1.  Sensation of lump in throat  -pt went to Dr. Sam Sterling who looked in throat and did see some redness   -could be anxiety but d/t length of time experiencing sx will refer to GI - [facility]  -pt would also like to do Cologard when Medicare starts in Sept.          HPI:  Per pt = Went to Dr. Sam Sterling and didn't see any tumor in throat, but still feels soreness at back of throat - Dr. Sam Sterling states he saw some redness that could be esophagitis (have not received Dr. Erasto Arnett note for review)  Pt has had several friends get \"weird\" dx - including brother in law with pancreatic cancer and she is worried d/t full feeling in throat not going away    Social History:  Nutrition: overall healthy - no problem eating, but still feels lump    Review of Systems:  - Constitutional Symptoms: no fevers, chills, weight loss  - Cardiovascular: no chest pain or palpitations  - Respiratory: no cough or shortness of breath  - Gastrointestinal: no dysphagia or abdominal pain    PHQ over the last two weeks 7/12/2018   Little interest or pleasure in doing things Not at all   Feeling down, depressed or hopeless Not at all   Total Score PHQ 2 0       I reviewed the following:  Past Medical History:   Diagnosis Date    Adhesive capsulitis of left shoulder     per 1/12/16 note    Advance directive discussed with patient 04/18/2016    Back pain     due to curvature           11/16/15 PT notes    Bilateral bunions     Bronchitis 3/8/16    [place] [facility] notes  ?early pneumonia    Left shoulder pain 12/15/15    [facility] 1/12/16 f/u note    Osteopenia     Palpitation     Pneumonia [id number]    Scoliosis     sees chiro    Thyroid disease     dr locke    Vitamin D deficiency 4/16/16    rx done for 3 months       Current Outpatient Prescriptions   Medication Sig Dispense Refill    levothyroxine (SYNTHROID) 112 mcg tablet TAKE 1 TABLET BY MOUTH  DAILY FOR THYROID  REPLACEMENT 90 Tab 3    cholecalciferol, vitamin D3, (VITAMIN D3) 2,000 unit tab Take  by mouth daily. No Known Allergies     O: VS:   Visit Vitals    /64 (BP 1 Location: Left arm, BP Patient Position: Sitting)    Pulse 67    Temp 98.1 °F (36.7 °C) (Oral)    Resp 19    Ht 5' 6.25\" (1.683 m)    Wt 153 lb (69.4 kg)    SpO2 98%    BMI 24.51 kg/m2     GENERAL: Thai Valdes is in no acute distress. Non-toxic. Well nourished. Well developed. Appropriately groomed. HEAD:  Normocephalic. Atraumatic. Non tender sinuses x 4. EYE: PERRLA. No drainage or discharge. EARS: Hearing intact bilaterally. External ear canals normal without evidence of blood or swelling. Bilateral TM's intact, pearly grey with landmarks visible. No erythema or effusion. NOSE: Patent. No erythema. No discharge. MOUTH: mucous membranes pink and moist. Posterior pharynx normal with cobblestone appearance. No erythema, white exudate or obstruction. NECK: supple. Midline trachea. No cervical adenopathy noted. RESP: Breath sounds are symmetrical bilaterally. Unlabored without SOB. Speaking in full sentences. Clear to auscultation bilaterally anteriorly and posteriorly. No wheezes. No rales or rhonchi. CV: normal rate. Regular rhythm. S1, S2 audible. No murmur noted. No rubs, clicks or gallops noted. SKIN: Skin is warm and dry. Turgor is normal. No petechiae, no purpura, no rash. No cyanosis. No mottling, jaundice or pallor. PSYCH: appropriate behavior, dress and thought processes. Good eye contact. Clear and coherent speech. Full affect. Good insight.     ___________________________________________________________________  Patient education was done. Advised on nutrition, physical activity, weight management, tobacco, alcohol and safety.   Counseling included discussion of diagnosis, differentials, treatment options, prescribed treatment, warning signs and follow up. Medication risks/benefits, interactions and alternatives discussed with patient.      Patient verbalized understanding and agreed to plan of care. Patient was given an after visit summary which included current diagnoses, medications and vital signs. Follow up as directed.

## 2018-07-12 NOTE — PATIENT INSTRUCTIONS
Throat soreness - referral to GI  Please make an appt with Dr. Oj Moraes. 1) For ringing in the ears, can try to dry up the sinus passages. Use an OTC decongestant nasal spray such as Flonase, Nasonex, or Rhinocort. These contain a steroid and will help reduce congestion. You can spray 2x in each nostril two times a day. Use the opposite hand of the nostril you are spraying and look down at your toes when you administer the nasal spray. This ensures the spray is applied to the nasal tissue properly. 2) One type of way to screen for colon cancer is to test the DNA of stool that looks for blood and certain gene changes sometimes found in colorectal cancer cell. Currently, the FDA-approved test is Cologuard®. A healthcare provider writes a prescription for the test and a testing kit is then mailed to your home. After providing a stool sample as directed, the patient mails the kit to the laboratory for testing. A stool DNA test may appeal to people who want to be screened, but dont want to undergo the usual preparation required for a colonoscopy and some other screening tests. It is a non-invasive test to check for colorectal cancer and, if negative, is repeated every 3 years. No special diet or bowel preparation (no laxatives or enemas) is required for a stool DNA test. However, if the test does show a possible cancer or pre-cancer, the patient would then need a colonoscopy to confirm it, and possibly to remove any polyps. Not everybody can have this type of screening test.  It is only for people with an average risk for colorectal cancer.   (If you have a personal history of pre-cancerous polyps, colorectal cancer or other risk factors, this test is not right for you.)      3) Joce Garcia, [address]  Phone: [phone]

## 2018-07-17 ENCOUNTER — TELEPHONE (OUTPATIENT)
Dept: FAMILY MEDICINE CLINIC | Age: 63
End: 2018-07-17

## 2018-07-17 NOTE — TELEPHONE ENCOUNTER
Patient called in stating to use nasal spray , she understand to use it 2x a day, but for how many days?   P: [phone]

## 2018-08-27 DIAGNOSIS — Z12.11 SCREEN FOR COLON CANCER: Primary | ICD-10-CM

## 2018-09-06 ENCOUNTER — OFFICE VISIT (OUTPATIENT)
Dept: FAMILY MEDICINE CLINIC | Age: 63
End: 2018-09-06

## 2018-09-06 VITALS
HEART RATE: 75 BPM | SYSTOLIC BLOOD PRESSURE: 98 MMHG | BODY MASS INDEX: 24.23 KG/M2 | RESPIRATION RATE: 19 BRPM | WEIGHT: 150.8 LBS | DIASTOLIC BLOOD PRESSURE: 56 MMHG | HEIGHT: 66 IN | OXYGEN SATURATION: 98 % | TEMPERATURE: 97.8 F

## 2018-09-06 DIAGNOSIS — R09.89 GLOBUS SENSATION: Primary | ICD-10-CM

## 2018-09-06 NOTE — PROGRESS NOTES
S: Meg Bonilla is a 57 y.o. female who presents for follow up     Assessment/Plan:   1. Globus sensation  -pt was evaluated by ENT and  GI - no issues noted  -Dr. Morris Diaz suggested colonoscopy + endoscopy appt, encouraged pt to schedule  -advised stress relieving therapies- yoga, exercise - pt to monitor if this relieves sensation         HPI:  Sore throat is still persisting  Saw Dr. Sam Sterling - ENT in July and he saw redness in back of throat but no tumors  7/12/18 - pt returned here for sensation in throat still present and given referral to GI (Dr. Morris Diaz)  Saw Dr. Morris Diaz yesterday - suggested she schedule colonoscopy + endoscopy together  Not pain - but more like discomfort  No lateralization of the symptoms,   No dysphagia,   No odynophagia,   + weight loss (2 lbs since 6/4/18)  + change in voice - but thinks it may be allergies - no hoarseness  No presence of a neck or tonsillar mass, and unexplained cervical     Pt under stress, has anxiety d/t brother with pancreatic cancer - going to visit every 2 weeks;  thinking of retiring. Discussed anxiety and globus sensation. Pt to monitor    Social History:  Nutrition: overall healthy   Physical: walks, hikes - very active  Social: lives with   Occupation: self employed    History   Smoking Status    Never Smoker   Smokeless Tobacco    Never Used     History   Alcohol Use No     History   Drug Use No        Review of Systems:  - Constitutional Symptoms: no fevers, chills  - Cardiovascular: no chest pain or palpitations  - Respiratory: no cough or shortness of breath  - Gastrointestinal: no dysphagia or abdominal pain  ROS is negative otherwise.     PHQ over the last two weeks 7/12/2018   Little interest or pleasure in doing things Not at all   Feeling down, depressed, irritable, or hopeless Not at all   Total Score PHQ 2 0       I reviewed the following:  Past Medical History:   Diagnosis Date    Adhesive capsulitis of left shoulder     per 1/12/16 note    Advance directive discussed with patient 04/18/2016    Back pain     due to curvature           11/16/15 PT notes    Bilateral bunions     Bronchitis 3/8/16    [place] [facility] notes  ?early pneumonia    Left shoulder pain 12/15/15    [facility] 1/12/16 f/u note    Osteopenia     Palpitation     Pneumonia [id number]    Scoliosis     sees chiro    Thyroid disease     dr locke    Vitamin D deficiency 4/16/16    rx done for 3 months       Current Outpatient Prescriptions   Medication Sig Dispense Refill    levothyroxine (SYNTHROID) 112 mcg tablet TAKE 1 TABLET BY MOUTH  DAILY FOR THYROID  REPLACEMENT 90 Tab 3    cholecalciferol, vitamin D3, (VITAMIN D3) 2,000 unit tab Take  by mouth daily. No Known Allergies     O: VS:   Visit Vitals    BP 98/56 (BP 1 Location: Left arm, BP Patient Position: Sitting)    Pulse 75    Temp 97.8 °F (36.6 °C) (Oral)    Resp 19    Ht 5' 6.25\" (1.683 m)    Wt 150 lb 12.8 oz (68.4 kg)    SpO2 98%    BMI 24.16 kg/m2       GENERAL: Rodrigo Freeman is in no acute distress. Non-toxic. Well nourished. Well developed. Appropriately groomed. HEAD:  Normocephalic. Atraumatic. Non tender sinuses x 4. EYE: PERRLA. No drainage or discharge. EARS: Hearing intact bilaterally. External ear canals normal without evidence of blood or swelling. Bilateral TM's intact, pearly grey with landmarks visible. No erythema or effusion. Tympanosclerosis noted bilateral center of TM's. NOSE: Patent. No erythema. No discharge. MOUTH: mucous membranes pink and moist. Posterior pharynx normal with cobblestone appearance. No erythema, white exudate or obstruction. NECK: supple. Midline trachea. No cervical adenopathy noted. RESP: Breath sounds are symmetrical bilaterally. Unlabored without SOB. Speaking in full sentences. Clear to auscultation bilaterally anteriorly and posteriorly. No wheezes. No rales or rhonchi. CV: normal rate. Regular rhythm.  S1, S2 audible. No murmur noted. No rubs, clicks or gallops noted. PSYCH: appropriate behavior, dress and thought processes. Good eye contact. Clear and coherent speech. Full affect. Good insight.     ___________________________________________________________________  Patient education was done. Advised on nutrition, physical activity, weight management, tobacco, alcohol and safety. Counseling included discussion of diagnosis, differentials, treatment options, prescribed treatment, warning signs and follow up. Medication risks/benefits, interactions and alternatives discussed with patient.      Patient verbalized understanding and agreed to plan of care. Patient was given an after visit summary which included current diagnoses, medications and vital signs. Follow up as directed. There are no Patient Instructions on file for this visit.

## 2018-09-06 NOTE — MR AVS SNAPSHOT
[address] [place] 
[address] [place] [address] 
[phone] Patient: Sridhar Dietrich MRN: [id number] MUX:[birth year] Visit Information Date & Time Provider Department Dept. Phone Encounter #  
 9/6/2018  9:20 AM Bryanna Major NP [facility] [phone] [id number] Follow-up Instructions Return if symptoms worsen or fail to improve. Upcoming Health Maintenance Date Due  
 PAP AKA CERVICAL CYTOLOGY 8/1/2015 Influenza Age 5 to Adult 8/1/2018 FOBT Q 1 YEAR AGE 50-75 10/19/2018 BREAST CANCER SCRN MAMMOGRAM 11/9/2019 DTaP/Tdap/Td series (2 - Td) 10/4/2027 Allergies as of 9/6/2018  Review Complete On: 9/6/2018 By: Bryanna Major NP No Known Allergies Current Immunizations  Reviewed on 10/4/2017 Name Date Tdap 10/4/2017 Zoster Vaccine, Live 11/7/2015 Not reviewed this visit You Were Diagnosed With   
  
 Codes Comments Globus sensation    -  Primary ICD-10-CM: F45.8 ICD-9-CM: 626. 4 Vitals BP Pulse Temp Resp Height(growth percentile) Weight(growth percentile) 98/56 (BP 1 Location: Left arm, BP Patient Position: Sitting) 75 97.8 °F (36.6 °C) (Oral) 19 5' 6.25\" (1.683 m) 150 lb 12.8 oz (68.4 kg) SpO2 BMI OB Status Smoking Status 98% 24.16 kg/m2 Postmenopausal Never Smoker BMI and BSA Data Body Mass Index Body Surface Area  
 24.16 kg/m 2 1.79 m 2 Preferred Pharmacy Pharmacy Name Phone 305 [facility], [address] [place] [address] [address] Your Updated Medication List  
  
   
This list is accurate as of 9/6/18  9:48 AM.  Always use your most recent med list.  
  
  
  
  
 levothyroxine 112 mcg tablet Commonly known as:  SYNTHROID  
TAKE 1 TABLET BY MOUTH  DAILY FOR THYROID  REPLACEMENT  
  
 VITAMIN D3 2,000 unit Tab Generic drug:  cholecalciferol (vitamin D3) Take  by mouth daily. Follow-up Instructions Return if symptoms worsen or fail to improve. Patient Instructions 1) Schedule a colonoscopy and endoscopy with Dr. Sumit Linares. The endoscopy will determine if you have gastric reflux (GERD). Anxiety can also be a cause of a globus sensation (lump in throat). Try relaxation techniques (yoga, exercise,) to see if this helps. Upper GI Endoscopy: Before Your Procedure What is an upper GI endoscopy? An upper gastrointestinal (or GI) endoscopy is a test that allows your doctor to look at the inside of your esophagus, stomach, and the first part of your small intestine, called the duodenum. The esophagus is the tube that carries food to your stomach. The doctor uses a thin, lighted tube that bends. It is called an endoscope, or scope. The doctor puts the tip of the scope in your mouth and gently moves it down your throat. The scope is a flexible video camera. The doctor looks at a monitor (like a TV set or a computer screen) as he or she moves the scope. A doctor may do this test, which is also called a procedure, to look for ulcers, tumors, infection, or bleeding. It also can be used to look for signs of acid backing up into your esophagus. This is called gastroesophageal reflux disease, or GERD. The doctor can use the scope to take a sample of tissue for study (a biopsy). The doctor also can use the scope to take out growths or stop bleeding. Follow-up care is a key part of your treatment and safety. Be sure to make and go to all appointments, and call your doctor if you are having problems. It's also a good idea to know your test results and keep a list of the medicines you take. What happens before the procedure? 
 Preparing for the procedure 
  · Understand exactly what procedure is planned, along with the risks, benefits, and other options.   
 · Tell your doctors ALL the medicines, vitamins, supplements, and herbal remedies you take. Some of these can increase the risk of bleeding or interact with anesthesia.  
  · If you take blood thinners, such as warfarin (Coumadin), clopidogrel (Plavix), or aspirin, be sure to talk to your doctor. He or she will tell you if you should stop taking these medicines before your procedure. Make sure that you understand exactly what your doctor wants you to do.  
  · Your doctor will tell you which medicines to take or stop before your procedure. You may need to stop taking certain medicines a week or more before the procedure. So talk to your doctor as soon as you can.  
  · If you have an advance directive, let your doctor know. It may include a living will and a durable power of  for health care. Bring a copy to the hospital. If you don't have one, you may want to prepare one. It lets your doctor and loved ones know your health care wishes. Doctors advise that everyone prepare these papers before any type of surgery or procedure. Procedures can be stressful. This information will help you understand what you can expect. And it will help you safely prepare for your procedure. What happens on the day of the procedure? · Follow the instructions exactly about when to stop eating and drinking. If you don't, your procedure may be canceled. If your doctor told you to take your medicines on the day of the procedure, take them with only a sip of water.  
  · Take a bath or shower before you come in for your procedure. Do not apply lotions, perfumes, deodorants, or nail polish.  
  · Take off all jewelry and piercings. And take out contact lenses, if you wear them.  
 At the hospital or surgery center · Bring a picture ID.  
  · The test may take 15 to 30 minutes.  
  · The doctor may spray medicine on the back of your throat to numb it. You also will get medicine to prevent pain and to relax you.  
  · You will lie on your left side.  The doctor will put the scope in your mouth and toward the back of your throat. The doctor will tell you when to swallow. This helps the scope move down your throat. You will be able to breathe normally. The doctor will move the scope down your esophagus into your stomach. The doctor also may look at the duodenum.  
  · If your doctor wants to take a sample of tissue for a biopsy, he or she may use small surgical tools, which are put into the scope, to cut off some tissue. You will not feel a biopsy, if one is taken. The doctor also can use the tools to stop bleeding or to do other treatments, if needed.  
  · You will stay at the hospital or surgery center for 1 to 2 hours until the medicine you were given wears off. Going home · Be sure you have someone to drive you home. Anesthesia and pain medicine make it unsafe for you to drive.  
  · You will be given more specific instructions about recovering from your procedure. They will cover things like diet, wound care, follow-up care, driving, and getting back to your normal routine. When should you call your doctor? · You have questions or concerns.  
  · You don't understand how to prepare for your procedure.  
  · You become ill before the procedure (such as fever, flu, or a cold).  
  · You need to reschedule or have changed your mind about having the procedure. Where can you learn more? Go to http://shwetha-rachel.info/. Enter P790 in the search box to learn more about \"Upper GI Endoscopy: Before Your Procedure. \" Current as of: May 12, 2017 Content Version: 11.7 © 3548-6453 [employer]. Care instructions adapted under license by [employer] (which disclaims liability or warranty for this information). If you have questions about a medical condition or this instruction, always ask your healthcare professional. [place] [address] any warranty or liability for your use of this information. Learning About Colonoscopy What is a colonoscopy? A colonoscopy is a test (also called a procedure) that lets a doctor look inside your large intestine. The doctor uses a thin, lighted tube called a colonoscope. The doctor uses it to look for small growths called polyps, colon or rectal cancer (colorectal cancer), or other problems like bleeding. During the procedure, the doctor can take samples of tissue. The samples can then be checked for cancer or other conditions. The doctor can also take out polyps. How is colonoscopy done? This procedure is done in a doctor's office or a clinic or hospital. You will get medicine to help you relax and not feel pain. Some people find that they do not remember having the test because of the medicine. The doctor gently moves the colonoscope, or scope, through the colon. The scope is also a small video camera. It lets the doctor see the colon and take pictures. A colonoscopy usually takes 30 to 45 minutes. It may take longer if the doctor has to remove polyps. How do you prepare for the procedure? You need to clean out your colon before the procedure so the doctor can see all of your colon. You may start the cleaning process a day or two before the test. This depends on which \"colon prep\" your doctor recommends. To clean your colon, you stop eating solid foods and drink only clear liquids. You can have water, tea, coffee, clear juices, clear broths, flavored ice pops, and gelatin (such as Jell-O). Do not drink anything red or purple, such as grape juice or fruit punch. And do not eat red or purple foods, such as grape ice pops or cherry gelatin. The day or night before the procedure, you drink a large amount of a special liquid. This causes loose, frequent stools. You will go to the bathroom a lot. It is very important to drink all of the colon prep liquid. If you have problems drinking the liquid, call your doctor.  
For many people, the prep is worse than the test. It may be uncomfortable, and you may feel hungry on the clear liquid diet. Some people do not go to work or do their usual activities on the day of the prep. Arrange to have someone take you home after the test. 
What can you expect after a colonoscopy? The nurses will watch you for 1 to 2 hours until the medicines wear off. Then you can go home. You will need a ride. Your doctor will tell you when you can eat and do your usual activities. Your doctor will talk to you about when you will need your next colonoscopy. The results of your test and your risk for colorectal cancer will help your doctor decide how often you need to be checked. Follow-up care is a key part of your treatment and safety. Be sure to make and go to all appointments, and call your doctor if you are having problems. It's also a good idea to know your test results and keep a list of the medicines you take. Where can you learn more? Go to http://shwetha-rachel.info/. Enter O483 in the search box to learn more about \"Learning About Colonoscopy. \" Current as of: May 12, 2017 Content Version: 11.7 © 0556-4240 [employer]. Care instructions adapted under license by [employer] (which disclaims liability or warranty for this information). If you have questions about a medical condition or this instruction, always ask your healthcare professional. [address] any warranty or liability for your use of this information. Introducing [facility]! [facility] introduces Arcivr patient portal. Now you can access parts of your medical record, email your doctor's office, and request medication refills online. 1. In your internet browser, go to [email or web address] 2. Click on the First Time User? Click Here link in the Sign In box. You will see the New Member Sign Up page. 3. Enter your Arcivr Access Code exactly as it appears below.  You will not need to use this code after youve completed the sign-up process. If you do not sign up before the expiration date, you must request a new code. · Synchronized Access Code: [email or web address] Expires: 12/5/2018  9:48 AM 
 
4. Enter the last four digits of your Social Security Number (xxxx) and Date of Birth (mm/dd/yyyy) as indicated and click Submit. You will be taken to the next sign-up page. 5. Create a Synchronized ID. This will be your Synchronized login ID and cannot be changed, so think of one that is secure and easy to remember. 6. Create a Synchronized password. You can change your password at any time. 7. Enter your Password Reset Question and Answer. This can be used at a later time if you forget your password. 8. Enter your e-mail address. You will receive e-mail notification when new information is available in [address] 9. Click Sign Up. You can now view and download portions of your medical record. 10. Click the Download Summary menu link to download a portable copy of your medical information. If you have questions, please visit the Frequently Asked Questions section of the Synchronized website. Remember, Synchronized is NOT to be used for urgent needs. For medical emergencies, dial 911. Now available from your iPhone and Android! Please provide this summary of care documentation to your next provider. Your primary care clinician is listed as [id number] If you have any questions after today's visit, please call [phone].

## 2018-09-06 NOTE — PROGRESS NOTES
Chief Complaint   Patient presents with    Sore Throat     [facility] two pt identifiers(name and ). Chief Complaint   Patient presents with    Sore Throat     Rm 10       1. Have you been to the ER, urgent care clinic since your last visit? n Hospitalized since your last visit? n    2. Have you seen or consulted any other health care providers outside of the [address] [place] [address] since your last visit? Include any pap smears or colon screening. Advance Care Planning    In the event something were to happen to you and you were unable to speak on your behalf, do you have an Advance Directive/ Living Will in place stating your wishes? NO    If yes, do we have a copy on file NO:    If no, would you like information YES Book given    Medication reconciliation up to date and corrected with patient at this time. Today's provider has been notified of reason for visit, vitals and flowsheets obtained on patients. Reviewed record in preparation for visit, huddled with provider and have obtained necessary documentation.       Health Maintenance Due   Topic    PAP AKA CERVICAL CYTOLOGY     Influenza Age 5 to Adult     FOBT Q 1 YEAR AGE 50-75        Wt Readings from Last 3 Encounters:   18 150 lb 12.8 oz (68.4 kg)   18 153 lb (69.4 kg)   18 153 lb 14.4 oz (69.8 kg)     Temp Readings from Last 3 Encounters:   18 97.8 °F (36.6 °C) (Oral)   18 98.1 °F (36.7 °C) (Oral)   18 96.8 °F (36 °C) (Oral)     BP Readings from Last 3 Encounters:   18 98/56   18 102/64   18 119/69     Pulse Readings from Last 3 Encounters:   18 75   18 67   18 65     Vitals:    18 0922   BP: 98/56   Pulse: 75   Resp: 19   Temp: 97.8 °F (36.6 °C)   TempSrc: Oral   SpO2: 98%   Weight: 150 lb 12.8 oz (68.4 kg)   Height: 5' 6.25\" (1.683 m)   PainSc:   2         Learning Assessment:  :     Learning Assessment 2014 PRIMARY LEARNER Patient   HIGHEST LEVEL OF EDUCATION - PRIMARY LEARNER  > 4 YEARS OF COLLEGE   BARRIERS PRIMARY LEARNER NONE   CO-LEARNER CAREGIVER No   PRIMARY LANGUAGE ENGLISH   LEARNER PREFERENCE PRIMARY DEMONSTRATION   ANSWERED BY patient   RELATIONSHIP SELF       Depression Screening:  :     PHQ over the last two weeks 9/6/2018   Little interest or pleasure in doing things Not at all   Feeling down, depressed, irritable, or hopeless Not at all   Total Score PHQ 2 0       Fall Risk Assessment:  :     No flowsheet data found. Abuse Screening:  :     No flowsheet data found. ADL Screening:  :     ADL Assessment 6/4/2018   Feeding yourself No Help Needed   Getting from bed to chair No Help Needed   Getting dressed No Help Needed   Bathing or showering No Help Needed   Walk across the room (includes cane/walker) No Help Needed   Using the telphone No Help Needed   Taking your medications No Help Needed   Preparing meals No Help Needed   Managing money (expenses/bills) No Help Needed   Moderately strenuous housework (laundry) No Help Needed   Shopping for personal items (toiletries/medicines) No Help Needed   Shopping for groceries No Help Needed   Driving No Help Needed   Climbing a flight of stairs No Help Needed   Getting to places beyond walking distances No Help Needed                 Medication reconciliation up to date and corrected with patient at this time.

## 2018-09-06 NOTE — PATIENT INSTRUCTIONS
1) Schedule a colonoscopy and endoscopy with Dr. Chloe Mccall. The endoscopy will determine if you have gastric reflux (GERD). Anxiety can also be a cause of a globus sensation (lump in throat). Try relaxation techniques (yoga, exercise,) to see if this helps. Upper GI Endoscopy: Before Your Procedure  What is an upper GI endoscopy? An upper gastrointestinal (or GI) endoscopy is a test that allows your doctor to look at the inside of your esophagus, stomach, and the first part of your small intestine, called the duodenum. The esophagus is the tube that carries food to your stomach. The doctor uses a thin, lighted tube that bends. It is called an endoscope, or scope. The doctor puts the tip of the scope in your mouth and gently moves it down your throat. The scope is a flexible video camera. The doctor looks at a monitor (like a TV set or a computer screen) as he or she moves the scope. A doctor may do this test, which is also called a procedure, to look for ulcers, tumors, infection, or bleeding. It also can be used to look for signs of acid backing up into your esophagus. This is called gastroesophageal reflux disease, or GERD. The doctor can use the scope to take a sample of tissue for study (a biopsy). The doctor also can use the scope to take out growths or stop bleeding. Follow-up care is a key part of your treatment and safety. Be sure to make and go to all appointments, and call your doctor if you are having problems. It's also a good idea to know your test results and keep a list of the medicines you take. What happens before the procedure?   Preparing for the procedure    · Understand exactly what procedure is planned, along with the risks, benefits, and other options. · Tell your doctors ALL the medicines, vitamins, supplements, and herbal remedies you take.  Some of these can increase the risk of bleeding or interact with anesthesia.     · If you take blood thinners, such as warfarin (Coumadin), clopidogrel (Plavix), or aspirin, be sure to talk to your doctor. He or she will tell you if you should stop taking these medicines before your procedure. Make sure that you understand exactly what your doctor wants you to do.     · Your doctor will tell you which medicines to take or stop before your procedure. You may need to stop taking certain medicines a week or more before the procedure. So talk to your doctor as soon as you can.     · If you have an advance directive, let your doctor know. It may include a living will and a durable power of  for health care. Bring a copy to the hospital. If you don't have one, you may want to prepare one. It lets your doctor and loved ones know your health care wishes. Doctors advise that everyone prepare these papers before any type of surgery or procedure. Procedures can be stressful. This information will help you understand what you can expect. And it will help you safely prepare for your procedure. What happens on the day of the procedure? · Follow the instructions exactly about when to stop eating and drinking. If you don't, your procedure may be canceled. If your doctor told you to take your medicines on the day of the procedure, take them with only a sip of water.     · Take a bath or shower before you come in for your procedure. Do not apply lotions, perfumes, deodorants, or nail polish.     · Take off all jewelry and piercings. And take out contact lenses, if you wear them.    At the hospital or surgery center   · Bring a picture ID.     · The test may take 15 to 30 minutes.     · The doctor may spray medicine on the back of your throat to numb it. You also will get medicine to prevent pain and to relax you.     · You will lie on your left side. The doctor will put the scope in your mouth and toward the back of your throat. The doctor will tell you when to swallow. This helps the scope move down your throat.  You will be able to breathe normally. The doctor will move the scope down your esophagus into your stomach. The doctor also may look at the duodenum.     · If your doctor wants to take a sample of tissue for a biopsy, he or she may use small surgical tools, which are put into the scope, to cut off some tissue. You will not feel a biopsy, if one is taken. The doctor also can use the tools to stop bleeding or to do other treatments, if needed.     · You will stay at the hospital or surgery center for 1 to 2 hours until the medicine you were given wears off. Going home   · Be sure you have someone to drive you home. Anesthesia and pain medicine make it unsafe for you to drive.     · You will be given more specific instructions about recovering from your procedure. They will cover things like diet, wound care, follow-up care, driving, and getting back to your normal routine. When should you call your doctor? · You have questions or concerns.     · You don't understand how to prepare for your procedure.     · You become ill before the procedure (such as fever, flu, or a cold).     · You need to reschedule or have changed your mind about having the procedure. Where can you learn more? Go to http://shwetha-rachel.info/. Enter P790 in the search box to learn more about \"Upper GI Endoscopy: Before Your Procedure. \"  Current as of: May 12, 2017  Content Version: 11.7  © 0652-8360 [employer]. Care instructions adapted under license by [employer] (which disclaims liability or warranty for this information). If you have questions about a medical condition or this instruction, always ask your healthcare professional. [place] [address] any warranty or liability for your use of this information. Learning About Colonoscopy  What is a colonoscopy? A colonoscopy is a test (also called a procedure) that lets a doctor look inside your large intestine.  The doctor uses a thin, lighted tube called a colonoscope. The doctor uses it to look for small growths called polyps, colon or rectal cancer (colorectal cancer), or other problems like bleeding. During the procedure, the doctor can take samples of tissue. The samples can then be checked for cancer or other conditions. The doctor can also take out polyps. How is colonoscopy done? This procedure is done in a doctor's office or a clinic or hospital. You will get medicine to help you relax and not feel pain. Some people find that they do not remember having the test because of the medicine. The doctor gently moves the colonoscope, or scope, through the colon. The scope is also a small video camera. It lets the doctor see the colon and take pictures. A colonoscopy usually takes 30 to 45 minutes. It may take longer if the doctor has to remove polyps. How do you prepare for the procedure? You need to clean out your colon before the procedure so the doctor can see all of your colon. You may start the cleaning process a day or two before the test. This depends on which \"colon prep\" your doctor recommends. To clean your colon, you stop eating solid foods and drink only clear liquids. You can have water, tea, coffee, clear juices, clear broths, flavored ice pops, and gelatin (such as Jell-O). Do not drink anything red or purple, such as grape juice or fruit punch. And do not eat red or purple foods, such as grape ice pops or cherry gelatin. The day or night before the procedure, you drink a large amount of a special liquid. This causes loose, frequent stools. You will go to the bathroom a lot. It is very important to drink all of the colon prep liquid. If you have problems drinking the liquid, call your doctor. For many people, the prep is worse than the test. It may be uncomfortable, and you may feel hungry on the clear liquid diet. Some people do not go to work or do their usual activities on the day of the prep.   Arrange to have someone take you home after the test.  What can you expect after a colonoscopy? The nurses will watch you for 1 to 2 hours until the medicines wear off. Then you can go home. You will need a ride. Your doctor will tell you when you can eat and do your usual activities. Your doctor will talk to you about when you will need your next colonoscopy. The results of your test and your risk for colorectal cancer will help your doctor decide how often you need to be checked. Follow-up care is a key part of your treatment and safety. Be sure to make and go to all appointments, and call your doctor if you are having problems. It's also a good idea to know your test results and keep a list of the medicines you take. Where can you learn more? Go to http://shewtha-rachel.info/. Enter X192 in the search box to learn more about \"Learning About Colonoscopy. \"  Current as of: May 12, 2017  Content Version: 11.7  © 6035-9782 [employer]. Care instructions adapted under license by [employer] (which disclaims liability or warranty for this information). If you have questions about a medical condition or this instruction, always ask your healthcare professional. [address] any warranty or liability for your use of this information.

## 2018-10-03 ENCOUNTER — TELEPHONE (OUTPATIENT)
Dept: FAMILY MEDICINE CLINIC | Age: 63
End: 2018-10-03

## 2018-10-03 NOTE — TELEPHONE ENCOUNTER
Patient called into the office stating that a Physical therapy script was supposed to be faxed over to [facility], their fax number is [phone]  Patients Phone: [phone]

## 2018-10-03 NOTE — TELEPHONE ENCOUNTER
Writer called patient to get more information about the PT she is requesting. Writer reviewed chart and did not see where PT was discussed at last office visit at office with Gregorio Reed. Writer spoke with patient. Patient verified . Writer asked patient about the PT she is requesting. Writer stated to patient that after reviewing chart, I did not see where it was discussed. Patient stated that she has been doing it for awhile now, and she was that she was told to ask her PCP office to renew referral to keep doing PT for her chronic back pain. Writer verbalized understanding and stated that a message would be placed in with Dr. Jennifer Devi.

## 2018-10-04 NOTE — TELEPHONE ENCOUNTER
Writer faxed over request for PT recommendation to continue care. DR. Jose E Gordillo will re-submit referral based on PT recommendation. Awaiting response.   Nothing further

## 2019-04-01 DIAGNOSIS — E03.4 HYPOTHYROIDISM DUE TO ACQUIRED ATROPHY OF THYROID: ICD-10-CM

## 2019-04-03 NOTE — TELEPHONE ENCOUNTER
Requested Prescriptions     Pending Prescriptions Disp Refills    levothyroxine (SYNTHROID) 112 mcg tablet 90 Tab 0     Pharmacy is requesting a 90 day supply.

## 2019-04-03 NOTE — TELEPHONE ENCOUNTER
PCP: Samina Diallo MD    Last appt: 9/6/2018  No future appointments.     Requested Prescriptions     Pending Prescriptions Disp Refills    levothyroxine (SYNTHROID) 112 mcg tablet 90 Tab 0       Prior labs and Blood pressures:  BP Readings from Last 3 Encounters:   09/06/18 98/56   07/12/18 102/64   06/04/18 119/69     Lab Results   Component Value Date/Time    Sodium 141 10/04/2017 11:02 AM    Potassium 4.6 10/04/2017 11:02 AM    Chloride 101 10/04/2017 11:02 AM    CO2 27 10/04/2017 11:02 AM    Glucose 89 10/04/2017 11:02 AM    BUN 15 10/04/2017 11:02 AM    Creatinine 0.81 10/04/2017 11:02 AM    BUN/Creatinine ratio 19 10/04/2017 11:02 AM    GFR est AA 89 10/04/2017 11:02 AM    GFR est non-AA 78 10/04/2017 11:02 AM    Calcium 9.7 10/04/2017 11:02 AM     No results found for: HBA1C, HGBE8, [id number], [id number]  Lab Results   Component Value Date/Time    Cholesterol, total 248 (H) 10/04/2017 11:02 AM    HDL Cholesterol 75 10/04/2017 11:02 AM    LDL, calculated 146 (H) 10/04/2017 11:02 AM    VLDL, calculated 27 10/04/2017 11:02 AM    Triglyceride 137 10/04/2017 11:02 AM     Lab Results   Component Value Date/Time    Vitamin D 25-Hydroxy 37.4 09/13/2011 11:27 AM    VITAMIN D, 25-HYDROXY 38.4 10/04/2017 11:02 AM       Lab Results   Component Value Date/Time    TSH 0.529 10/04/2017 11:02 AM

## 2019-04-04 RX ORDER — LEVOTHYROXINE SODIUM 112 UG/1
TABLET ORAL
Qty: 90 TAB | Refills: 0 | Status: SHIPPED | OUTPATIENT
Start: 2019-04-04 | End: 2019-07-05 | Stop reason: SDUPTHER

## 2019-07-05 DIAGNOSIS — E03.4 HYPOTHYROIDISM DUE TO ACQUIRED ATROPHY OF THYROID: ICD-10-CM

## 2019-07-05 RX ORDER — LEVOTHYROXINE SODIUM 112 UG/1
TABLET ORAL
Qty: 90 TAB | Refills: 0 | Status: SHIPPED | OUTPATIENT
Start: 2019-07-05 | End: 2019-10-02 | Stop reason: SDUPTHER

## 2019-07-05 NOTE — TELEPHONE ENCOUNTER
PCP: Noy Pearson MD    Last appt: 9/6/2018  No future appointments.     Requested Prescriptions     Pending Prescriptions Disp Refills    levothyroxine (SYNTHROID) 112 mcg tablet 90 Tab 0     Sig: TAKE 1 TABLET BY MOUTH  DAILY FOR THYROID  REPLACEMENT       Prior labs and Blood pressures:  BP Readings from Last 3 Encounters:   09/06/18 98/56   07/12/18 102/64   06/04/18 119/69     Lab Results   Component Value Date/Time    Sodium 141 10/04/2017 11:02 AM    Potassium 4.6 10/04/2017 11:02 AM    Chloride 101 10/04/2017 11:02 AM    CO2 27 10/04/2017 11:02 AM    Glucose 89 10/04/2017 11:02 AM    BUN 15 10/04/2017 11:02 AM    Creatinine 0.81 10/04/2017 11:02 AM    BUN/Creatinine ratio 19 10/04/2017 11:02 AM    GFR est AA 89 10/04/2017 11:02 AM    GFR est non-AA 78 10/04/2017 11:02 AM    Calcium 9.7 10/04/2017 11:02 AM     No results found for: HBA1C, HGBE8, QGI0RKZS, [id number]  Lab Results   Component Value Date/Time    Cholesterol, total 248 (H) 10/04/2017 11:02 AM    HDL Cholesterol 75 10/04/2017 11:02 AM    LDL, calculated 146 (H) 10/04/2017 11:02 AM    VLDL, calculated 27 10/04/2017 11:02 AM    Triglyceride 137 10/04/2017 11:02 AM     Lab Results   Component Value Date/Time    Vitamin D 25-Hydroxy 37.4 09/13/2011 11:27 AM    VITAMIN D, 25-HYDROXY 38.4 10/04/2017 11:02 AM       Lab Results   Component Value Date/Time    TSH 0.529 10/04/2017 11:02 AM

## 2019-07-05 NOTE — TELEPHONE ENCOUNTER
Requested Prescriptions     Pending Prescriptions Disp Refills    levothyroxine (SYNTHROID) 112 mcg tablet 90 Tab 0     Sig: TAKE 1 TABLET BY MOUTH  DAILY FOR THYROID  REPLACEMENT

## 2019-07-25 ENCOUNTER — OFFICE VISIT (OUTPATIENT)
Dept: FAMILY MEDICINE CLINIC | Age: 64
End: 2019-07-25

## 2019-07-25 DIAGNOSIS — E03.9 HYPOTHYROIDISM, UNSPECIFIED TYPE: Primary | ICD-10-CM

## 2019-07-25 NOTE — PROGRESS NOTES
Pt not eligible for [facility] until 9/2019. Nurse left message on home machine, but pt never rec'd message.   Will rtc in Sept. for [address] and TSH values

## 2019-10-02 DIAGNOSIS — E03.4 HYPOTHYROIDISM DUE TO ACQUIRED ATROPHY OF THYROID: ICD-10-CM

## 2019-10-02 RX ORDER — LEVOTHYROXINE SODIUM 112 UG/1
TABLET ORAL
Qty: 30 TAB | Refills: 0 | Status: SHIPPED | OUTPATIENT
Start: 2019-10-02 | End: 2019-11-15 | Stop reason: SDUPTHER

## 2019-10-02 NOTE — TELEPHONE ENCOUNTER
PCP: Patrice Baker MD    Last appt: 7/25/2019  Future Appointments   Date Time Provider Aleena Moise   10/25/2019  9:00 AM Tim Coto NP [facility]       Requested Prescriptions     Pending Prescriptions Disp Refills    levothyroxine (SYNTHROID) 112 mcg tablet [Pharmacy Med Name: LEVOTHYROXINE 112 MCG TABLET] 90 Tab 0     Sig: TAKE 1 TABLET BY MOUTH DAILY FOR THYROID REPLACEMENT       Prior labs and Blood pressures:  BP Readings from Last 3 Encounters:   09/06/18 98/56   07/12/18 102/64   06/04/18 119/69     Lab Results   Component Value Date/Time    Sodium 141 10/04/2017 11:02 AM    Potassium 4.6 10/04/2017 11:02 AM    Chloride 101 10/04/2017 11:02 AM    CO2 27 10/04/2017 11:02 AM    Glucose 89 10/04/2017 11:02 AM    BUN 15 10/04/2017 11:02 AM    Creatinine 0.81 10/04/2017 11:02 AM    BUN/Creatinine ratio 19 10/04/2017 11:02 AM    GFR est AA 89 10/04/2017 11:02 AM    GFR est non-AA 78 10/04/2017 11:02 AM    Calcium 9.7 10/04/2017 11:02 AM     No results found for: HBA1C, HGBE8, [id number], [id number]  Lab Results   Component Value Date/Time    Cholesterol, total 248 (H) 10/04/2017 11:02 AM    HDL Cholesterol 75 10/04/2017 11:02 AM    LDL, calculated 146 (H) 10/04/2017 11:02 AM    VLDL, calculated 27 10/04/2017 11:02 AM    Triglyceride 137 10/04/2017 11:02 AM     Lab Results   Component Value Date/Time    Vitamin D 25-Hydroxy 37.4 09/13/2011 11:27 AM    VITAMIN D, 25-HYDROXY 38.4 10/04/2017 11:02 AM       Lab Results   Component Value Date/Time    TSH 0.529 10/04/2017 11:02 AM

## 2019-10-25 ENCOUNTER — OFFICE VISIT (OUTPATIENT)
Dept: FAMILY MEDICINE CLINIC | Age: 64
End: 2019-10-25

## 2019-10-25 VITALS
RESPIRATION RATE: 19 BRPM | HEIGHT: 67 IN | BODY MASS INDEX: 24.3 KG/M2 | SYSTOLIC BLOOD PRESSURE: 107 MMHG | WEIGHT: 154.8 LBS | HEART RATE: 79 BPM | OXYGEN SATURATION: 98 % | TEMPERATURE: 97.9 F | DIASTOLIC BLOOD PRESSURE: 62 MMHG

## 2019-10-25 DIAGNOSIS — Z12.39 SCREENING FOR BREAST CANCER: ICD-10-CM

## 2019-10-25 DIAGNOSIS — Z12.11 SCREEN FOR COLON CANCER: ICD-10-CM

## 2019-10-25 DIAGNOSIS — E03.9 HYPOTHYROIDISM, UNSPECIFIED TYPE: ICD-10-CM

## 2019-10-25 DIAGNOSIS — E55.9 VITAMIN D DEFICIENCY: ICD-10-CM

## 2019-10-25 DIAGNOSIS — Z00.00 MEDICARE ANNUAL WELLNESS VISIT, SUBSEQUENT: ICD-10-CM

## 2019-10-25 DIAGNOSIS — M79.641 PAIN IN BOTH HANDS: Primary | ICD-10-CM

## 2019-10-25 DIAGNOSIS — M85.80 OSTEOPENIA, UNSPECIFIED LOCATION: ICD-10-CM

## 2019-10-25 DIAGNOSIS — E78.00 PURE HYPERCHOLESTEROLEMIA: ICD-10-CM

## 2019-10-25 DIAGNOSIS — Z78.0 POSTMENOPAUSAL: ICD-10-CM

## 2019-10-25 DIAGNOSIS — Z12.31 ENCOUNTER FOR SCREENING MAMMOGRAM FOR MALIGNANT NEOPLASM OF BREAST: ICD-10-CM

## 2019-10-25 DIAGNOSIS — Z00.00 LABORATORY EXAM ORDERED AS PART OF ROUTINE GENERAL MEDICAL EXAMINATION: ICD-10-CM

## 2019-10-25 DIAGNOSIS — M79.642 PAIN IN BOTH HANDS: Primary | ICD-10-CM

## 2019-10-25 NOTE — PROGRESS NOTES
Assessment/Plan:  1. Medicare annual wellness visit, subsequent  -discussed healthy diet and increasing daily exercise  -labs today: CBC, CMP, urinalysis, lipid, TSH, Vit D (will return on fast)  -HM: Dexa, mammo ordered, colonoscopy    2. Pain in both hands  -right hand - 1st digit (proximal) 5mm firm, mobile nodule  - REFERRAL TO ORTHOPEDICS - Sarah Potter    3. Hypothyroidism, unspecified type  -current therapy: levothyroxine 112mcg  - TSH RFX ON ABNORMAL TO FREE T4    4. Pure hypercholesterolemia  -LDL =146 (10/2017)  - LIPID PANEL    RTC 1 year MWV, pending lab results                Date of visit: 10/25/2019       This is an Subsequent Medicare Annual Wellness Visit (AWV), (Performed more than 12 months after effective date of Medicare Part B enrollment and 12 months after last preventive visit)    I have reviewed the patient's medical history in detail and updated the computerized patient record. Cleve Torrez is a 75 y.o. female   History obtained from: the patient.   Patient lives: independently    Cognitive Impairment concerns: no    History     Patient Active Problem List   Diagnosis Code    Pure hypercholesterolemia E78.00    Osteopenia M85.80    Hypothyroid E03.9    Scoliosis M41.9    Postmenopausal bone loss M81.0     Past Medical History:   Diagnosis Date    Adhesive capsulitis of left shoulder     per 1/12/16 note    Advance directive discussed with patient 04/18/2016    Back pain     due to curvature           11/16/15 PT notes    Bilateral bunions     Bronchitis 3/8/16    [facility] notes  ?early pneumonia    Left shoulder pain 12/15/15    [facility] 1/12/16 f/u note    Osteopenia     Palpitation     Pneumonia [id number]    Scoliosis     sees chiro    Thyroid disease     dr locke    Vitamin D deficiency 4/16/16    rx done for 3 months      Past Surgical History:   Procedure Laterality Date    BREAST SURGERY PROCEDURE UNLISTED      left  breast fibroid    ENDOSCOPY, COLON, DIAGNOSTIC      10/01/07 repeat 10 gelrud    HX BREAST BIOPSY Right     Surgical Bx 1978 - BENIGN    HX HEENT      tonsil , septoplasty     No Known Allergies  Current Outpatient Medications   Medication Sig Dispense Refill    levothyroxine (SYNTHROID) 112 mcg tablet TAKE 1 TABLET BY MOUTH DAILY FOR THYROID REPLACEMENT 30 Tab 0    cholecalciferol, vitamin D3, (VITAMIN D3) 2,000 unit tab Take  by mouth daily. Family History   Problem Relation Age of Onset    Lung Disease Mother     Cancer Mother         lung cancer-heavy smoker    Lung Disease Father     Cancer Father         lung cancer--heavy smoker    Alcohol abuse Father     Alcohol abuse Brother     Diabetes Maternal Grandmother     Diabetes Maternal Grandfather     Lung Disease Paternal Grandmother     Diabetes Paternal Grandfather     Other Brother         polio    Thyroid Disease Sister      Social History     Tobacco Use    Smoking status: Never Smoker    Smokeless tobacco: Never Used   Substance Use Topics    Alcohol use: No          Specialists/Care Team   Portialatricia Fernando Perri has established care with the following healthcare providers:  Patient Care Team:  Natasha Lema MD as PCP - General  Maria T Johansen MD (Endocrinology)  Manuel Thurman MD as Nurse Practitioner (Gynecology)  Romy Acevedo MD (Orthopedic Surgery)      Health Risk Assessment     Demographics   female  75 y.o. General Health Questions   -During the past 4 weeks:   How would you rate your health in general? Good  How much have you been bothered by bodily pain? mildly  Has your physical and emotional health limited your social activities with family or friends? no    Emotional Health Questions     3 most recent PHQ Screens 10/25/2019   Little interest or pleasure in doing things Not at all   Feeling down, depressed, irritable, or hopeless Not at all   Total Score PHQ 2 0     Health Habits   Please describe your diet habits: overall healthy  Do you get 5 servings of fruits or vegetables daily? yes  Do you exercise regularly? yes    Alcohol and Tobacco Risk Factor Screening:     Social History     Tobacco Use   Smoking Status Never Smoker   Smokeless Tobacco Never Used     Social History     Substance and Sexual Activity   Alcohol Use No         Activities of Daily Living and Functional Status   Do you need help with eating, walking, dressing, bathing, toileting, the phone, transportation, shopping, preparing meals, housework, laundry, or medications:  no  -Do you need help managing money? no  -In the past four weeks, was someone available to help you if you needed and wanted help with anything? yes  -Are you confident are you that you can control and manage most of your health problems? yes  -Have you been given information to help you keep track of your medications? yes  -How often do you have trouble taking your medications as prescribed? never    Hearing Loss   Have you noticed any hearing difficulties? no    Fall Risk and Home Safety   How often have you been bothered by feeling dizzy when standing up? never  Have you fallen 2 or more times in the past year? Had a fall trying to pull out a bush in yard - didn't   Does your home have good lighting, grab bars in the bathroom, handrails on the stairs, good lighting? yes  Does you home have throw rugs on floor or clutter you can trip over? no  Do you have smoke detectors and check them regularly? yes  Do you have difficulties driving a car? no  Do you always fasten your seat belt when you are in a car? yes  Fall Risk Assessment:    Fall Risk Assessment, last 12 mths 10/25/2019   Able to walk? Yes   Fall in past 12 months? Yes   Fall with injury?  Yes   Number of falls in past 12 months 1   Fall Risk Score 2         Abuse Screen   Patient is not abused    Evaluation of Cognitive Function   Mood/affect:  happy  Orientation: Person, Place, Time and Situation  Appearance: age appropriate  Family member/caregiver input: none    Physical Examination     Vitals:    10/25/19 0907   BP: 107/62   Pulse: 79   Resp: 19   Temp: 97.9 °F (36.6 °C)   TempSrc: Oral   SpO2: 98%   Weight: 154 lb 12.8 oz (70.2 kg)   Height: 5' 6.5\" (1.689 m)     Body mass index is 24.61 kg/m². No exam data present  Patient's timed Up & Go test steady or shorter than 30 seconds? yes      Advice/Referrals/Counseling (as indicated)   Education and counseling provided for any problems identified above:   Screenings   Vaccines  Annual Flu shot  Healthy eating  Daily exercise    Preventive Services       (Preventive care checklist to be included in patient instructions)  Discussed today Done Previously Not Needed     2018  Glaucoma screening      Colorectal cancer screening (colonoscopy)    2017 -porosis  Osteoporosis Screening (DEXA scan)    2017  Breast cancer Screening (Mammogram)     x Cervical cancer Screening (Pap smear)     x Prostate cancer Screening   x   Cardiovascular Screening (Lipid panel)   x   Diabetes screening test (Hgb A1c)     x Abdominal ultrasound for AAA (65-75 male smokers)     x Low-dose CT for lung cancer screening    Will get at [facility]   Flu vaccine     x Hepatitis B vaccine (if at risk)    Will get it at [facility]  10/2018  Pneumococcal 23  Prevnar 13      Tdap vaccine    10/2019  Shingles vaccine      Screening for Hepatitis C (b [id number])     Discussion of Advance Directive     Patient was offered the opportunity to discuss advance care planning:  yes     Does patient have an Advance Directive:  no     If no, did you provide information on [facility]?   yes       Assessment/Plan   Z00.00  Brother in law  from pancreatic cancer 10 months ago   19 and me told her macular degeneration risk and slightly increase of Alzheimer's  Has nodules on right hand - 1st digit 5mm

## 2019-10-25 NOTE — PROGRESS NOTES
Trupti Garcia  Identified pt with two pt identifiers(name and ). Chief Complaint   Patient presents with   [address] [place] [address] Annual Wellness Visit     Room 10    Follow-up     3 month       1. Have you been to the ER, urgent care clinic since your last visit? Y  Hospitalized since your last visit? N    2. Have you seen or consulted any other health care providers outside of the [address] [place] [address] since your last visit? Include any pap smears or colon screening. N       Advance Care Planning    In the event something were to happen to you and you were unable to speak on your behalf, do you have an Advance Directive/ Living Will in place stating your wishes? NO    If yes, do we have a copy on file NO    If no, would you like information NO    Medication reconciliation up to date and corrected with patient at this time. Today's provider has been notified of reason for visit, vitals and flowsheets obtained on patients. Reviewed record in preparation for visit, huddled with provider and have obtained necessary documentation.       Health Maintenance Due   Topic    GLAUCOMA SCREENING Q2Y     FOBT Q 1 YEAR AGE 54-65     MEDICARE YEARLY EXAM     Influenza Age 5 to Adult     BREAST CANCER SCRN MAMMOGRAM        Wt Readings from Last 3 Encounters:   10/25/19 154 lb 12.8 oz (70.2 kg)   18 150 lb 12.8 oz (68.4 kg)   18 153 lb (69.4 kg)     Temp Readings from Last 3 Encounters:   10/25/19 97.9 °F (36.6 °C) (Oral)   18 97.8 °F (36.6 °C) (Oral)   18 98.1 °F (36.7 °C) (Oral)     BP Readings from Last 3 Encounters:   10/25/19 107/62   18 98/56   18 102/64     Pulse Readings from Last 3 Encounters:   10/25/19 79   18 75   18 67     Vitals:    10/25/19 0907   BP: 107/62   Pulse: 79   Resp: 19   Temp: 97.9 °F (36.6 °C)   TempSrc: Oral   SpO2: 98%   Weight: 154 lb 12.8 oz (70.2 kg)   Height: 5' 6.5\" (1.689 m)   PainSc:   0 - No pain         Learning Assessment:  : Learning Assessment 10/25/2019 1/27/2014   PRIMARY LEARNER Patient Patient   HIGHEST LEVEL OF EDUCATION - PRIMARY LEARNER  - > 4 YEARS OF COLLEGE   BARRIERS PRIMARY LEARNER - NONE   CO-LEARNER CAREGIVER - No   PRIMARY LANGUAGE ENGLISH ENGLISH   LEARNER PREFERENCE PRIMARY DEMONSTRATION DEMONSTRATION   ANSWERED BY patient patient   RELATIONSHIP SELF SELF       Depression Screening:  :     3 most recent PHQ Screens 10/25/2019   Little interest or pleasure in doing things Not at all   Feeling down, depressed, irritable, or hopeless Not at all   Total Score PHQ 2 0       No flowsheet data found. Fall Risk Assessment:  :     Fall Risk Assessment, last 12 mths 10/25/2019   Able to walk? Yes   Fall in past 12 months? Yes   Fall with injury? Yes   Number of falls in past 12 months 1   Fall Risk Score 2       Abuse Screening:  :     Abuse Screening Questionnaire 10/25/2019   Do you ever feel afraid of your partner? N   Are you in a relationship with someone who physically or mentally threatens you? N   Is it safe for you to go home?  Y       ADL Screening:  :     ADL Assessment 10/25/2019   Feeding yourself No Help Needed   Getting from bed to chair No Help Needed   Getting dressed No Help Needed   Bathing or showering No Help Needed   Walk across the room (includes cane/walker) No Help Needed   Using the telphone No Help Needed   Taking your medications No Help Needed   Preparing meals No Help Needed   Managing money (expenses/bills) No Help Needed   Moderately strenuous housework (laundry) No Help Needed   Shopping for personal items (toiletries/medicines) No Help Needed   Shopping for groceries No Help Needed   Driving No Help Needed   Climbing a flight of stairs No Help Needed   Getting to places beyond walking distances No Help Needed           BMI:  Weight Metrics 10/25/2019 9/6/2018 7/12/2018 6/4/2018 10/4/2017 8/1/2016 4/18/2016   Weight 154 lb 12.8 oz 150 lb 12.8 oz 153 lb 153 lb 14.4 oz 152 lb 9.6 oz 160 lb 156 lb 0.4 oz   BMI 24.61 kg/m2 24.16 kg/m2 24.51 kg/m2 24.65 kg/m2 24.44 kg/m2 24.33 kg/m2 23.73 kg/m2           Medication reconciliation up to date and corrected with patient at this time.

## 2019-10-25 NOTE — PATIENT INSTRUCTIONS
1)  
Schedule of Personalized Health Plan The best way to stay healthy is to live a healthy lifestyle. A healthy lifestyle includes regular exercise, eating a well-balanced diet, keeping a healthy weight and not smoking. Regular physical exams and screening tests are another important way to take care of yourself. Preventive exams provided by health care providers can find health problems early when treatment works best and can keep you from getting certain diseases or illnesses. Preventive services include exams, lab tests, screening tests, shots, and learning information to help you take care of your own health. The CDC recommends pneumonia vaccines for anyone 72 years and older. These vaccines are usually only needed once in a lifetime unless your healthcare provider decides differently. The 2 pneumonia shots available presently are PCV 13 (Prevnar 13) and PPSV23 (Pneumovax 23). Adults 72 years or older who have not previously received PCV13, should receive a dose of PCV13 first, followed 1 year later by a dose of PPSV23. All people over 65 should have a yearly flu vaccine. People over 65 are at medium to high risk for Hepatitis B. Hepatitis B is transmitted through body fluids with a common source being sexual activity or IV drug use. Three shots are needed for complete protection. The CDC recommends the herpes zoster (shingles) vaccine for all adults 61 and older, regardless if a prior episode of zoster has been reported. In addition to your physical exam, some screening tests are recommended: 
 
Osteoporosis screening -There are no signs or symptoms of osteoporosis (weakening of bones). You might not know you have the disease until you break a bone. Thats why its so important to get a bone density test to measure your bone strength.  Bone mass measurement is taken with a Dexa scan and is recommended every two years after 72years old or if you have certain risk factors, such as personal history of vertebral fracture or chronic steroid medication use. Diabetes Mellitus screening is recommended every year. This is a blood test, called a hemoglobin A1c, which measures the average blood sugar over a 3 month period. Glaucoma is an eye disease caused by high pressure in the eye. An eye exam is recommended every year. Cardiovascular screening tests that check your cholesterol and other blood fat (lipid) levels are recommended every five years or yearly if you are on medications for cardiovascular disease. Colorectal Cancer screening tests help to find pre-cancerous polyps (growths in the colon) so they can be removed before they turn into cancer. Screening tests are recommended starting at age 48 or earlier if you have a certain risk factors, such as a family history of colon cancer. Breast Cancer screening is done with a mammogram, a low dose x-ray that looks at breast tissue. It is recommended by the [facility] L[facility] Garcia that women 54 years and older get a mammogram every 2 years. After the age of 76, recommendations are based on life expectancy. Cervical Cancer screening is done by a PAP smear during a pelvic exam.  The American College of Obstetricians and Gynecologists states that screening can be discontinued after 72years old if the person has had adequate negative prior screening results and no abnormal history (abnormal = CIN2 or higher level). Here is a list of your current Health Maintenance items including a date when each one is due next: 
Health Maintenance Topic Date Due  GLAUCOMA SCREENING Q2Y  10/05/2018  FOBT Q 1 YEAR AGE 50-75  10/19/2018  MEDICARE YEARLY EXAM  07/25/2019  Influenza Age 5 to Adult  08/01/2019  BREAST CANCER SCRN MAMMOGRAM  11/09/2019  Pneumococcal 65+ years (2 of 2 - PPSV23) 10/31/2019  Shingrix Vaccine Age 50> (2 of 2) 12/15/2019  DTaP/Tdap/Td series (2 - Td) 10/04/2027  Hepatitis C Screening  Completed  Bone Densitometry (Dexa) Screening  Completed You do not have an [address] [facility] on file. 2) Warts are caused by the human papillomavirus (HPV). Cutaneous warts are most common in children and young adults, but can affect all ages. Infection occurs by direct contact with HPV and there is a higher risk of infection if the skin is broken. Treatment of cutaneous warts is not mandatory. Most warts in healthy patients eventually resolve without treatment, however it may take months to years to resolve and warts can spread in the meantime. Treatment:Topical salicylic acid and cryotherapy with liquid nitrogen are the most common treatments for common and plantar warts and have the strongest evidence for efficacy. 1) Over the counter (OTC) topical salicylic acid (such as Compound W or Dr. Lara Harvey) treats warts by exfoliation of infected skin and may also stimulate local immunity. Advantages of salicylic acid include self-administration, painless application, and minimal risk for serious side effects. Cover with duct tape 24 hours - should take about 3 weeks 2) Cryotherapy involves spraying the wart with liquid nitrogen. The location of the wart and the thickness of the skin around the wart will determine how long it takes for the blister to form. Sometimes a crust or scab may form instead. After 4 to 7 days, the blister will break, dry up and fall off. Treatment is repeated every two to three weeks until wart resolution. If a response is not achieved within six treatments, an alternative treatment may be needed. 3) Prescription:  
Canthardin - derived from beetles is a blistering agent used to treat warts and molluscum. It causes skin under wart to blister, lifting the wart off the skin when the blister dries and peels.  
 
Imiquimod, (a topical immunomodulator), is a prescription medication that acts as an immune response modifier and is used to treat genital warts, superficial basal cell carcinoma, and actinic keratosis. 
 
 3) High cholesterol Try these lifestyle strategies to lower your cholesterol: ? Decrease saturated fats in diet. Saturated fats are found in:  
o meats including fatty beef, pork, lamb, chicken or turkey with skin, and ground beef,  
o high fat cheese,  
o whole fat diary, milk, cream and ice cream, 
o butter 
o most saturated fats are found in animal products ? Eliminate Trans Fats from your diet. Foods that contain TF include: 
o Fast foods: fried chicken, biscuits, fried fish for fried fish sandwichs, [place] fries 
o Donuts and muffins 
o Crackers and cookies 
o Cake, cake icing and pies 
o Canned biscuits or refrigerated dough 
o Microwave popcorn 
o Coffee creamer 
o Frozen pizza 
o Stick margarine or vegetable shortening ? Increase the amount of soluble fiber in your diet. Sources of soluble fiber include: 
o oats, peas, beans, apples, citrus fruits, carrots, barley and psyllium ? Include omega-3 fatty acids in your diet, these are found in:  
o FISH! Try and eat 2 servings of fish a week. Good examples include salmon, albacore tuna, halibut, trout and mackerel. 
o Take a fish oil supplement daily ? Look for foods enriched with plant-sterols. There are many products on the market which are fortified with this nutrient including buttery spreads and yogurts. Look for the Benechol and Promise brands. ? Red yeast rice - at least 1800 mg daily to help lower cholesterol. Take either 2 of the 900 or 1200 mg capsules daily or 3-4 of the 600 mg capsules all together or  throughout the day ? Increase your physical activity to at least 150 minutes a week. This is just 5 sessions of thirty minutes a week! ? Losing weight can significantly lower your cholesterol.   
? If you are a smoker, QUIT SMOKING, this can significantly lower your cholesterol and overall cardiovascular risk. It also can help to decrease your risk for many other conditions. 4) Osteoporosis screening -There are no signs or symptoms of osteoporosis (weakening of bones). You might not know you have the disease until you break a bone. Thats why its so important to get a bone density test to measure your bone strength. Bone mass measurement is taken with a Dexa scan and is recommended every two years after 72years old or if you have certain risk factors, such as personal history of vertebral fracture or chronic steroid medication use. The Dexa Scan compares your bone density to women of the same age (Z score) as well as a healthy 27year old (T score). The lower the score (more negative), the lower your bone density. A T score of -2.5 or lower indicates osteoporosis (bone loss). A T-score between -1.0 to -2.5 indicates osteopenia (bone thinning). A T-score above -1.0 is normal.   
 
5) A mammogram is a screening test used to detect breast cancer. It is a low dose x-ray that images breast tissue. The American Cancer Society recommends that women age 39- 55 years old get an annual mammogram and women 54 years and older get a mammogram every 2 years. The [place] Task Force recommends screening mammograms every 2 years, starting at age 48 until 76. After the age of 76, recommendations are based on health history and life expectancy. 6) Referral to [facility] for further evaluation of hand nodules 7) Referral to Dr. Adelina Hackett for colonoscopy 8) Make a lab only appt and come in on 8 hour fast.  
Labs The following blood work was ordered today. Once the tests are completed, you will receive a letter, email or phone call with the results. If you have not heard from us within 10-14 days, please call the office for the results.  
 
Complete Blood Count (CBC) helps evaluate your overall health, including hemoglobin and red blood cells that carry oxygen, white blood cells that fight infection and platelets that help with clotting. Complete Metabolic Panel (CMP) assesses electrolytes, kidney and liver function.  (A Basic Metabolic Panel (BMP) does not include liver function.) Electrolytes include sodium, potassium, calcium, and chloride. These are necessary for cell function and an imbalance can cause serious problems. BUN and creatinine are markers of kidney function. ALT and AST are markers of liver function. Total Cholesterol is the total number of cholesterol particles in your blood, which includes triglycerides, HDL and LDL. A small amount of cholesterol is needed for the body's cells, hormones, and metabolism. Goal is less than 200. Triglycerides are the short term fats in your blood which are used for energy. Goal is less than 150. High Density Lipids (HDL) is the \"good\" cholesterol in your blood. HDL helps remove bad cholesterol from your blood. Goal is more than 40. Low Density Lipids (LDL) is the \"bad\" cholesterol in your blood. LDL can stick to your arteries, raising the risk for heart attack and stroke. Goal is less than 100 Urinalysis - this is a test of your urine to check your overall health. It is recommended as a part of a routine check up and screens for a variety of disorders, such as urinary tract infections, kidney disease and diabetes. Your thyroid is responsible for secreting hormones and regulating your metabolism. Thyroid Stimulating Hormone (TSH) is a test for thyroid function. TSH is a hormone made by the pituitary gland in the brain and tells your thyroid gland to make hormones and release them into your blood. If your thyroid isn't producing enough hormone, you may have symptoms such as unexplained weight gain, fatigue, hair loss.   If your thyroid is producing too much hormone, you may have symptoms of diarrhea, heart palpitations, fatigue, unexplained weight loss. A normal TSH value is between 0.45 - 4.5. Vitamin D is important for absorbing calcium and promoting bone growth. If you are low in Vitamin D, you may experience fatigue, back or bone pain, hair loss, muscle pain, slow wound healing, depression. Your body can make Vitamin D after exposure to sunlight or through foods like fatty fish (tuna, mackerel, salmon), food with Vitamin D added (dairy products, orange juice and cereals) and cheese, egg yolks and liver. Vitamin D decreases with age and in the winter time when the sun is not strong. A deficiency in Vitamin D has been linked to certain cancers (breast, prostate, colon) as well as heart disease, depression and weight gain. Well Visit, Over 72: Care Instructions Your Care Instructions Physical exams can help you stay healthy. Your doctor has checked your overall health and may have suggested ways to take good care of yourself. He or she also may have recommended tests. At home, you can help prevent illness with healthy eating, regular exercise, and other steps. Follow-up care is a key part of your treatment and safety. Be sure to make and go to all appointments, and call your doctor if you are having problems. It's also a good idea to know your test results and keep a list of the medicines you take. How can you care for yourself at home? · Reach and stay at a healthy weight. This will lower your risk for many problems, such as obesity, diabetes, heart disease, and high blood pressure. · Get at least 30 minutes of exercise on most days of the week. Walking is a good choice. You also may want to do other activities, such as running, swimming, cycling, or playing tennis or team sports. · Do not smoke. Smoking can make health problems worse. If you need help quitting, talk to your doctor about stop-smoking programs and medicines. These can increase your chances of quitting for good. · Protect your skin from too much sun. When you're outdoors from 10 a.m. to 4 p.m., stay in the shade or cover up with clothing and a hat with a wide brim. Wear sunglasses that block UV rays. Even when it's cloudy, put broad-spectrum sunscreen (SPF 30 or higher) on any exposed skin. · See a dentist one or two times a year for checkups and to have your teeth cleaned. · Wear a seat belt in the car. Follow your doctor's advice about when to have certain tests. These tests can spot problems early. For men and women · Cholesterol. Your doctor will tell you how often to have this done based on your overall health and other things that can increase your risk for heart attack and stroke. · Blood pressure. Have your blood pressure checked during a routine doctor visit. Your doctor will tell you how often to check your blood pressure based on your age, your blood pressure results, and other factors. · Diabetes. Ask your doctor whether you should have tests for diabetes. · Vision. Experts recommend that you have yearly exams for glaucoma and other age-related eye problems. · Hearing. Tell your doctor if you notice any change in your hearing. You can have tests to find out how well you hear. · Colon cancer tests. Keep having colon cancer tests as your doctor recommends. You can have one of several types of tests. · Heart attack and stroke risk. At least every 4 to 6 years, you should have your risk for heart attack and stroke assessed. Your doctor uses factors such as your age, blood pressure, cholesterol, and whether you smoke or have diabetes to show what your risk for a heart attack or stroke is over the next 10 years. · Osteoporosis. Talk to your doctor about whether you should have a bone density test to find out whether you have thinning bones. Also ask your doctor about whether you should take calcium and vitamin D supplements. For women · Pap test and pelvic exam. You may no longer need a Pap test. Talk with your doctor about whether to stop or continue to have Pap tests. · Breast exam and mammogram. Ask how often you should have a mammogram, which is an X-ray of your breasts. A mammogram can spot breast cancer before it can be felt and when it is easiest to treat. · Thyroid disease. Talk to your doctor about whether to have your thyroid checked as part of a regular physical exam. Women have an increased chance of a thyroid problem. For men · Prostate exam. Talk to your doctor about whether you should have a blood test (called a PSA test) for prostate cancer. Experts recommend that you discuss the benefits and risks of the test with your doctor before you decide whether to have this test. Some experts say that men ages 79 and older no longer need testing. · Abdominal aortic aneurysm. Ask your doctor whether you should have a test to check for an aneurysm. You may need a test if you ever smoked or if your parent, brother, sister, or child has had an aneurysm. When should you call for help? Watch closely for changes in your health, and be sure to contact your doctor if you have any problems or symptoms that concern you. Where can you learn more? Go to http://shwetha-rachel.info/. Enter K281 in the search box to learn more about \"Well Visit, Over 65: Care Instructions. \" Current as of: December 13, 2018 Content Version: 12.2 © 9698-1907 [employer]. Care instructions adapted under license by [employer] (which disclaims liability or warranty for this information). If you have questions about a medical condition or this instruction, always ask your healthcare professional. [place] [address] any warranty or liability for your use of this information.

## 2019-11-26 ENCOUNTER — HOSPITAL ENCOUNTER (OUTPATIENT)
Dept: MAMMOGRAPHY | Age: 64
Discharge: HOME | End: 2019-11-26
Attending: NURSE PRACTITIONER
Payer: MEDICARE

## 2019-11-26 DIAGNOSIS — M85.80 OSTEOPENIA, UNSPECIFIED LOCATION: ICD-10-CM

## 2019-11-26 DIAGNOSIS — Z12.31 ENCOUNTER FOR SCREENING MAMMOGRAM FOR MALIGNANT NEOPLASM OF BREAST: ICD-10-CM

## 2019-11-26 DIAGNOSIS — Z78.0 POSTMENOPAUSAL: ICD-10-CM

## 2019-11-26 DIAGNOSIS — Z12.39 SCREENING FOR BREAST CANCER: ICD-10-CM

## 2019-11-26 PROCEDURE — 77080 DXA BONE DENSITY AXIAL: CPT

## 2019-11-26 PROCEDURE — 77063 BREAST TOMOSYNTHESIS BI: CPT

## 2019-11-26 NOTE — PROGRESS NOTES
Osteoporosis - pt should make OV to discuss meds, if she just wants to start fosamax 35mg weekly, will send in rx. Result ltr mailed.

## 2019-12-02 NOTE — PROGRESS NOTES
Verified two patient identifiers, name and date of birth. Patient provided with imaging results. No questions at this time  Patient declines to start medication at this time. Patient reports she will make an appointment with [facility] after some of her other labs come back.

## 2019-12-06 LAB
25(OH)D3+25(OH)D2 SERPL-MCNC: 40.5 NG/ML (ref 30–100)
ALBUMIN SERPL-MCNC: 4.7 G/DL (ref 3.6–4.8)
ALBUMIN/GLOB SERPL: 2 {RATIO} (ref 1.2–2.2)
ALP SERPL-CCNC: 78 IU/L (ref 39–117)
ALT SERPL-CCNC: 19 IU/L (ref 0–32)
APPEARANCE UR: CLEAR
AST SERPL-CCNC: 19 IU/L (ref 0–40)
BACTERIA #/AREA URNS HPF: NORMAL /[HPF]
BASOPHILS # BLD AUTO: 0.1 X10E3/UL (ref 0–0.2)
BASOPHILS NFR BLD AUTO: 1 %
BILIRUB SERPL-MCNC: 0.7 MG/DL (ref 0–1.2)
BILIRUB UR QL STRIP: NEGATIVE
BUN SERPL-MCNC: 14 MG/DL (ref 8–27)
BUN/CREAT SERPL: 18 (ref 12–28)
CALCIUM SERPL-MCNC: 9.8 MG/DL (ref 8.7–10.3)
CASTS URNS QL MICRO: NORMAL /LPF
CHLORIDE SERPL-SCNC: 101 MMOL/L (ref 96–106)
CHOLEST SERPL-MCNC: 283 MG/DL (ref 100–199)
CO2 SERPL-SCNC: 25 MMOL/L (ref 20–29)
COLOR UR: YELLOW
CREAT SERPL-MCNC: 0.76 MG/DL (ref 0.57–1)
EOSINOPHIL # BLD AUTO: 0.3 X10E3/UL (ref 0–0.4)
EOSINOPHIL NFR BLD AUTO: 5 %
EPI CELLS #/AREA URNS HPF: NORMAL /HPF (ref 0–10)
ERYTHROCYTE [DISTWIDTH] IN BLOOD BY AUTOMATED COUNT: 11.8 % (ref 12.3–15.4)
GLOBULIN SER CALC-MCNC: 2.4 G/DL (ref 1.5–4.5)
GLUCOSE SERPL-MCNC: 83 MG/DL (ref 65–99)
GLUCOSE UR QL: NEGATIVE
HCT VFR BLD AUTO: 38.4 % (ref 34–46.6)
HDLC SERPL-MCNC: 81 MG/DL
HGB BLD-MCNC: 13.7 G/DL (ref 11.1–15.9)
HGB UR QL STRIP: NEGATIVE
IMM GRANULOCYTES # BLD AUTO: 0 X10E3/UL (ref 0–0.1)
IMM GRANULOCYTES NFR BLD AUTO: 0 %
INTERPRETATION, 910389: NORMAL
KETONES UR QL STRIP: NEGATIVE
LDLC SERPL CALC-MCNC: 173 MG/DL (ref 0–99)
LEUKOCYTE ESTERASE UR QL STRIP: NEGATIVE
LYMPHOCYTES # BLD AUTO: 1.6 X10E3/UL (ref 0.7–3.1)
LYMPHOCYTES NFR BLD AUTO: 26 %
MCH RBC QN AUTO: 32.2 PG (ref 26.6–33)
MCHC RBC AUTO-ENTMCNC: 35.7 G/DL (ref 31.5–35.7)
MCV RBC AUTO: 90 FL (ref 79–97)
MICRO URNS: NORMAL
MICRO URNS: NORMAL
MONOCYTES # BLD AUTO: 0.5 X10E3/UL (ref 0.1–0.9)
MONOCYTES NFR BLD AUTO: 8 %
MUCOUS THREADS URNS QL MICRO: PRESENT
NEUTROPHILS # BLD AUTO: 3.6 X10E3/UL (ref 1.4–7)
NEUTROPHILS NFR BLD AUTO: 60 %
NITRITE UR QL STRIP: NEGATIVE
PH UR STRIP: 7.5 [PH] (ref 5–7.5)
PLATELET # BLD AUTO: 298 X10E3/UL (ref 150–450)
POTASSIUM SERPL-SCNC: 4.6 MMOL/L (ref 3.5–5.2)
PROT SERPL-MCNC: 7.1 G/DL (ref 6–8.5)
PROT UR QL STRIP: NEGATIVE
RBC # BLD AUTO: 4.25 X10E6/UL (ref 3.77–5.28)
RBC #/AREA URNS HPF: NORMAL /HPF (ref 0–2)
SODIUM SERPL-SCNC: 141 MMOL/L (ref 134–144)
SP GR UR: 1.01 (ref 1–1.03)
TRIGL SERPL-MCNC: 144 MG/DL (ref 0–149)
TSH SERPL DL<=0.005 MIU/L-ACNC: 1.35 UIU/ML (ref 0.45–4.5)
URINALYSIS REFLEX, 377202: NORMAL
UROBILINOGEN UR STRIP-MCNC: 0.2 MG/DL (ref 0.2–1)
VLDLC SERPL CALC-MCNC: 29 MG/DL (ref 5–40)
WBC # BLD AUTO: 6 X10E3/UL (ref 3.4–10.8)
WBC #/AREA URNS HPF: NORMAL /HPF (ref 0–5)

## 2019-12-07 DIAGNOSIS — E03.4 HYPOTHYROIDISM DUE TO ACQUIRED ATROPHY OF THYROID: ICD-10-CM

## 2019-12-09 RX ORDER — LEVOTHYROXINE SODIUM 112 UG/1
TABLET ORAL
Qty: 90 TAB | Refills: 3 | Status: SHIPPED | OUTPATIENT
Start: 2019-12-09 | End: 2020-12-16

## 2019-12-09 NOTE — TELEPHONE ENCOUNTER
PCP: Diana Mallory MD    Last appt: 12/5/2019  No future appointments.     Requested Prescriptions     Pending Prescriptions Disp Refills    levothyroxine (SYNTHROID) 112 mcg tablet [Pharmacy Med Name: LEVOTHYROXINE 112 MCG TABLET] 30 Tab 0     Sig: TAKE 1 TABLET BY MOUTH DAILY FOR THYROID REPLACEMENT

## 2020-02-20 ENCOUNTER — OFFICE VISIT (OUTPATIENT)
Dept: FAMILY MEDICINE CLINIC | Age: 65
End: 2020-02-20

## 2020-02-20 VITALS
RESPIRATION RATE: 18 BRPM | WEIGHT: 154 LBS | HEART RATE: 71 BPM | SYSTOLIC BLOOD PRESSURE: 100 MMHG | HEIGHT: 67 IN | BODY MASS INDEX: 24.17 KG/M2 | OXYGEN SATURATION: 99 % | DIASTOLIC BLOOD PRESSURE: 60 MMHG | TEMPERATURE: 98.1 F

## 2020-02-20 DIAGNOSIS — M81.0 OSTEOPOROSIS WITHOUT CURRENT PATHOLOGICAL FRACTURE, UNSPECIFIED OSTEOPOROSIS TYPE: Primary | ICD-10-CM

## 2020-02-20 DIAGNOSIS — E78.00 PURE HYPERCHOLESTEROLEMIA: ICD-10-CM

## 2020-02-20 RX ORDER — ALENDRONATE SODIUM 35 MG/1
35 TABLET ORAL
Qty: 12 TAB | Refills: 1 | Status: SHIPPED | OUTPATIENT
Start: 2020-02-20 | End: 2020-07-30

## 2020-02-20 NOTE — PATIENT INSTRUCTIONS
1) For supplements and herbs: There are many herbs and supplements on the market promising results for a variety of ailments. However, you have to be careful as these products are not regulated by a scientific organization, like the FDA, for quality or production control. The Professor Shantel Merrill ([facility]) has a great website with reliable information about herbs  at https://[facility].nih.gov/health/herbsataglance.htm. Look at this to see what herbs have scientific evidence to back their claims. 2) Osteoporosis is a disease that makes your bones weak, causing bones to easily break. There usually are no symptoms of osteoporosis, until you break a bone. Alendronate (Fosamax) 35mg (a weekly pill) is used to treat osteoporosis. 1. It is a pill taken with an 8oz glass of water once a week. 2. It is taken first thing in the morning when you wake up and at least 30 minutes before the first food, beverage (except plain water), or other medication(s) of the day. Do not take with mineral water or with other beverages. 3. Stay upright (do not lie down) for at least 30 minutes and until after eating first food of the day (to reduce esophageal irritation). 4. We will consider discontinuing after 3 to 5 years if, at that time, the risk for fracture is low. Take these supplements daily: (2 hours after taking aldenodronate)  Calcium -1200mg daily (max of 500mg per dose if you take a supplement)  Vitamin D - 2000 international units (IU) daily  · Vitamin D3 (Cholecalciferol) is best.  · May take 800-4000 international units daily, unless advised otherwise    To reduce risk of progression to osteoporosis, calcium and Vitamin D supplements as well as daily weight bearing exercise (30-60 minutes/day) are recommended. Weight bearing exercises include walking, tennis, gardening, aerobic, and hiking. Do not smoke, as this contributes to bone loss.   Additionally, alcohol has negative effects on bone health, so stop or limit alcohol consumption. 3) Sign up for MyChart      Learning About the Mediterranean Diet  What is the [address]? The Mediterranean diet is a style of eating rather than a diet plan. It features foods eaten in [place], [place], [place] and [place], and other countries along the [facility]. It emphasizes eating foods like fish, fruits, vegetables, beans, high-fiber breads and whole grains, nuts, and olive oil. This style of eating includes limited red meat, cheese, and sweets. Why choose the Mediterranean diet? A Mediterranean-style diet may improve heart health. It contains more fat than other heart-healthy diets. But the fats are mainly from nuts, unsaturated oils (such as fish oils and olive oil), and certain nut or seed oils (such as canola, soybean, or flaxseed oil). These fats may help protect the heart and blood vessels. How can you get started on the Mediterranean diet? Here are some things you can do to switch to a more Mediterranean way of eating. What to eat  · Eat a variety of fruits and vegetables each day, such as grapes, blueberries, tomatoes, broccoli, peppers, figs, olives, spinach, eggplant, beans, lentils, and chickpeas. · Eat a variety of whole-grain foods each day, such as oats, brown rice, and whole wheat bread, pasta, and couscous. · Eat fish at least 2 times a week. Try tuna, salmon, mackerel, lake trout, herring, or sardines. · Eat moderate amounts of low-fat dairy products, such as milk, cheese, or yogurt. · Eat moderate amounts of poultry and eggs. · Choose healthy (unsaturated) fats, such as nuts, olive oil, and certain nut or seed oils like canola, soybean, and flaxseed. · Limit unhealthy (saturated) fats, such as butter, palm oil, and coconut oil. And limit fats found in animal products, such as meat and dairy products made with whole milk. Try to eat red meat only a few times a month in very small amounts.   · Limit sweets and desserts to only a few times a week. This includes sugar-sweetened drinks like soda. The Mediterranean diet may also include red wine with your meal--1 glass each day for women and up to 2 glasses a day for men. Tips for eating at home  · Use herbs, spices, garlic, lemon zest, and citrus juice instead of salt to add flavor to foods. · Add avocado slices to your sandwich instead of clark. · Have fish for lunch or dinner instead of red meat. Brush the fish with olive oil, and broil or grill it. · Sprinkle your salad with seeds or nuts instead of cheese. · Cook with olive or canola oil instead of butter or oils that are high in saturated fat. · Switch from 2% milk or whole milk to 1% or fat-free milk. · Dip raw vegetables in a vinaigrette dressing or hummus instead of dips made from mayonnaise or sour cream.  · Have a piece of fruit for dessert instead of a piece of cake. Try baked apples, or have some dried fruit. Tips for eating out  · Try broiled, grilled, baked, or poached fish instead of having it fried or breaded. · Ask your  to have your meals prepared with olive oil instead of butter. · Order dishes made with marinara sauce or sauces made from olive oil. Avoid sauces made from cream or mayonnaise. · Choose whole-grain breads, whole wheat pasta and pizza crust, brown rice, beans, and lentils. · Cut back on butter or margarine on bread. Instead, you can dip your bread in a small amount of olive oil. · Ask for a side salad or grilled vegetables instead of french fries or chips. Where can you learn more? Go to http://shwetha-rachel.info/. Enter 186-329-0628 in the search box to learn more about \"Learning About the Mediterranean Diet. \"  Current as of: November 7, 2018  Content Version: 12.2  © 1507-8156 [employer]. Care instructions adapted under license by [employer] (which disclaims liability or warranty for this information).  If you have questions about a medical condition or this instruction, always ask your healthcare professional. [place] [address] any warranty or liability for your use of this information.

## 2020-02-20 NOTE — PROGRESS NOTES
Jennifer Rhoades is a 75 y.o. female  HIPAA verified by two patient identifiers. Health Maintenance Due   Topic    GLAUCOMA SCREENING Q2Y     FOBT Q1Y Age 54-65     Influenza Age 5 to Adult     Pneumococcal 65+ years (2 of 2 - PPSV23)    Shingrix Vaccine Age 49> (2 of 2)     Chief Complaint   Patient presents with    Follow-up     Visit Vitals  /60 (BP 1 Location: Right arm, BP Patient Position: Sitting)   Pulse 71   Temp 98.1 °F (36.7 °C) (Oral)   Resp 18   Ht 5' 6.5\" (1.689 m)   Wt 154 lb (69.9 kg)   SpO2 99%   BMI 24.48 kg/m²       Pain Scale: 0 - No pain/10  Pain Location:     1. Have you been to the ER, urgent care clinic since your last visit? Hospitalized since your last visit? No    2. Have you seen or consulted any other health care providers outside of the [facility] since your last visit? Include any pap smears or colon screening.  No

## 2020-02-20 NOTE — PROGRESS NOTES
S: Kaushal Thomas is a 75 y.o. female who presents for follow up     Assessment/Plan:     1. Osteoporosis without current pathological fracture, unspecified osteoporosis type  -Dexa 2019 showed opsteoporosis (-4.1 right wrist)  -pt agrees to trial bisphosphonate, but worried about gerd, advised to track any s/s with medication use  -trial: fosamax 35mg weekly    2. Pure hypercholesterolemia  -2019 labs: TC = 283, T; HDL:81  LDL: 173; ASCVD - 4.5%   -reviewed LM and red yeast rice, [facility] website for supplements/herbs given       HPI:  Comes to talk about lab results   TC = 283, LDL - 173  ASCVD - 4.5%   Taking red yeast rice - did have some nausea, not sure it was due to this or not; also was taking flaxseed supplement; stopped both    Will go to Dr. Mariela Schaffer for colonoscopy     Dexa scan - right wrist = -41, right femoral neck = -2.1, right hip = -2.2  Pt didn't want to take fosamax, discussed prolia, but needs to fail bisphosphonates before insurance pays  Discussed s/s of osteoporosis and reviewed RF including age, postmenopausal, female  Pt agrees to trial fosamax weekly - will monitor for increase in GERD    Social History:  Nutrition: overall healthy   Physical: daily   Social: lives with       Social History     Tobacco Use   Smoking Status Never Smoker   Smokeless Tobacco Never Used     Social History     Substance and Sexual Activity   Alcohol Use No     Social History     Substance and Sexual Activity   Drug Use No        Review of Systems:  - Constitutional Symptoms: no fevers, chills, weight loss  - Eyes: no blurry vision or double vision  - Cardiovascular: no chest pain or palpitations  ROS is negative otherwise.     3 most recent PHQ Screens 2020   Little interest or pleasure in doing things Not at all   Feeling down, depressed, irritable, or hopeless Not at all   Total Score PHQ 2 0       I reviewed the following:  Past Medical History:   Diagnosis Date    Adhesive capsulitis of left shoulder     per 1/12/16 note    Advance directive discussed with patient 04/18/2016    Back pain     due to curvature           11/16/15 PT notes    Bilateral bunions     Bronchitis 3/8/16    [place] [facility] notes  ?early pneumonia    Left shoulder pain 12/15/15    [facility] 1/12/16 f/u note    Menopause     LMP-early 46s?  Osteopenia     Palpitation     Pneumonia 223996    Scoliosis     sees chiro    Thyroid disease     dr locke    Vitamin D deficiency 4/16/16    rx done for 3 months       Current Outpatient Medications   Medication Sig Dispense Refill    calcium-vits X2-B-J7-minerals 166.75 mg- 166.75 unit cap calcium      levothyroxine (SYNTHROID) 112 mcg tablet TAKE 1 TABLET BY MOUTH DAILY FOR THYROID REPLACEMENT 90 Tab 3    cholecalciferol, vitamin D3, (VITAMIN D3) 2,000 unit tab Take  by mouth daily. No Known Allergies     O: VS:   Visit Vitals  /60 (BP 1 Location: Right arm, BP Patient Position: Sitting)   Pulse 71   Temp 98.1 °F (36.7 °C) (Oral)   Resp 18   Ht 5' 6.5\" (1.689 m)   Wt 154 lb (69.9 kg)   SpO2 99%   BMI 24.48 kg/m²       Data Reviewed:  Labs Dec 2019    GENERAL: Nicho Amaya is in no acute distress. Non-toxic. Well nourished. Well developed. Appropriately groomed. PSYCH: appropriate behavior, dress and thought processes. Good eye contact. Clear and coherent speech. Full affect. Good insight.     ___________________________________________________________________  I spent >25 minutes face to face with patient with >50% of time spent in counseling and coordinating care. Patient education was done. Advised on nutrition, physical activity, weight management, tobacco, alcohol and safety. Counseling included discussion of diagnosis, differentials, treatment options, prescribed treatment, warning signs and follow up.  Medication risks/benefits, interactions and alternatives discussed with patient.      Patient verbalized understanding and agreed to plan of care. Patient was given an after visit summary which included current diagnoses, medications and vital signs. Follow up as directed.

## 2020-07-30 RX ORDER — ALENDRONATE SODIUM 35 MG/1
35 TABLET ORAL
Qty: 12 TAB | Refills: 1 | Status: SHIPPED | OUTPATIENT
Start: 2020-07-30 | End: 2021-01-21

## 2020-10-27 ENCOUNTER — OFFICE VISIT (OUTPATIENT)
Dept: FAMILY MEDICINE CLINIC | Age: 65
End: 2020-10-27
Payer: MEDICARE

## 2020-10-27 VITALS
OXYGEN SATURATION: 98 % | HEART RATE: 69 BPM | DIASTOLIC BLOOD PRESSURE: 72 MMHG | WEIGHT: 155 LBS | RESPIRATION RATE: 16 BRPM | BODY MASS INDEX: 24.33 KG/M2 | SYSTOLIC BLOOD PRESSURE: 112 MMHG | TEMPERATURE: 96.6 F | HEIGHT: 67 IN

## 2020-10-27 DIAGNOSIS — Z00.00 MEDICARE ANNUAL WELLNESS VISIT, SUBSEQUENT: Primary | ICD-10-CM

## 2020-10-27 DIAGNOSIS — E78.00 PURE HYPERCHOLESTEROLEMIA: ICD-10-CM

## 2020-10-27 DIAGNOSIS — Z00.00 LABORATORY EXAM ORDERED AS PART OF ROUTINE GENERAL MEDICAL EXAMINATION: ICD-10-CM

## 2020-10-27 DIAGNOSIS — Z12.11 SCREEN FOR COLON CANCER: ICD-10-CM

## 2020-10-27 DIAGNOSIS — E03.9 HYPOTHYROIDISM, UNSPECIFIED TYPE: ICD-10-CM

## 2020-10-27 LAB
ALBUMIN SERPL-MCNC: 4.5 G/DL (ref 3.5–5)
ALBUMIN/GLOB SERPL: 1.4 {RATIO} (ref 1.1–2.2)
ALP SERPL-CCNC: 65 U/L (ref 45–117)
ALT SERPL-CCNC: 27 U/L (ref 12–78)
ANION GAP SERPL CALC-SCNC: 7 MMOL/L (ref 5–15)
APPEARANCE UR: CLEAR
AST SERPL-CCNC: 18 U/L (ref 15–37)
BACTERIA URNS QL MICRO: NEGATIVE /HPF
BASOPHILS # BLD: 0.1 K/UL (ref 0–0.1)
BASOPHILS NFR BLD: 1 % (ref 0–1)
BILIRUB SERPL-MCNC: 0.7 MG/DL (ref 0.2–1)
BILIRUB UR QL: NEGATIVE
BUN SERPL-MCNC: 16 MG/DL (ref 6–20)
BUN/CREAT SERPL: 18 (ref 12–20)
CALCIUM SERPL-MCNC: 9.5 MG/DL (ref 8.5–10.1)
CHLORIDE SERPL-SCNC: 104 MMOL/L (ref 97–108)
CHOLEST SERPL-MCNC: 287 MG/DL
CO2 SERPL-SCNC: 29 MMOL/L (ref 21–32)
COLOR UR: ABNORMAL
CREAT SERPL-MCNC: 0.87 MG/DL (ref 0.55–1.02)
DIFFERENTIAL METHOD BLD: ABNORMAL
EOSINOPHIL # BLD: 0.7 K/UL (ref 0–0.4)
EOSINOPHIL NFR BLD: 10 % (ref 0–7)
EPITH CASTS URNS QL MICRO: ABNORMAL /LPF
ERYTHROCYTE [DISTWIDTH] IN BLOOD BY AUTOMATED COUNT: 12.6 % (ref 11.5–14.5)
GLOBULIN SER CALC-MCNC: 3.3 G/DL (ref 2–4)
GLUCOSE SERPL-MCNC: 85 MG/DL (ref 65–100)
GLUCOSE UR STRIP.AUTO-MCNC: NEGATIVE MG/DL
HCT VFR BLD AUTO: 42.5 % (ref 35–47)
HDLC SERPL-MCNC: 88 MG/DL
HDLC SERPL: 3.3 {RATIO} (ref 0–5)
HGB BLD-MCNC: 13.6 G/DL (ref 11.5–16)
HGB UR QL STRIP: NEGATIVE
HYALINE CASTS URNS QL MICRO: ABNORMAL /LPF (ref 0–5)
IMM GRANULOCYTES # BLD AUTO: 0 K/UL (ref 0–0.04)
IMM GRANULOCYTES NFR BLD AUTO: 0 % (ref 0–0.5)
KETONES UR QL STRIP.AUTO: NEGATIVE MG/DL
LDLC SERPL CALC-MCNC: 177.4 MG/DL (ref 0–100)
LEUKOCYTE ESTERASE UR QL STRIP.AUTO: ABNORMAL
LIPID PROFILE,FLP: ABNORMAL
LYMPHOCYTES # BLD: 1.9 K/UL (ref 0.8–3.5)
LYMPHOCYTES NFR BLD: 25 % (ref 12–49)
MCH RBC QN AUTO: 31.7 PG (ref 26–34)
MCHC RBC AUTO-ENTMCNC: 32 G/DL (ref 30–36.5)
MCV RBC AUTO: 99.1 FL (ref 80–99)
MONOCYTES # BLD: 0.5 K/UL (ref 0–1)
MONOCYTES NFR BLD: 7 % (ref 5–13)
NEUTS SEG # BLD: 4.4 K/UL (ref 1.8–8)
NEUTS SEG NFR BLD: 57 % (ref 32–75)
NITRITE UR QL STRIP.AUTO: NEGATIVE
NRBC # BLD: 0 K/UL (ref 0–0.01)
NRBC BLD-RTO: 0 PER 100 WBC
PH UR STRIP: 6.5 [PH] (ref 5–8)
PLATELET # BLD AUTO: 281 K/UL (ref 150–400)
PMV BLD AUTO: 10.5 FL (ref 8.9–12.9)
POTASSIUM SERPL-SCNC: 4.8 MMOL/L (ref 3.5–5.1)
PROT SERPL-MCNC: 7.8 G/DL (ref 6.4–8.2)
PROT UR STRIP-MCNC: NEGATIVE MG/DL
RBC # BLD AUTO: 4.29 M/UL (ref 3.8–5.2)
RBC #/AREA URNS HPF: ABNORMAL /HPF (ref 0–5)
SODIUM SERPL-SCNC: 140 MMOL/L (ref 136–145)
SP GR UR REFRACTOMETRY: 1.02 (ref 1–1.03)
TRIGL SERPL-MCNC: 108 MG/DL (ref ?–150)
TSH SERPL DL<=0.05 MIU/L-ACNC: 2.89 UIU/ML (ref 0.36–3.74)
UA: UC IF INDICATED,UAUC: ABNORMAL
UROBILINOGEN UR QL STRIP.AUTO: 0.2 EU/DL (ref 0.2–1)
VLDLC SERPL CALC-MCNC: 21.6 MG/DL
WBC # BLD AUTO: 7.7 K/UL (ref 3.6–11)
WBC URNS QL MICRO: ABNORMAL /HPF (ref 0–4)

## 2020-10-27 PROCEDURE — G9899 SCRN MAM PERF RSLTS DOC: HCPCS | Performed by: NURSE PRACTITIONER

## 2020-10-27 PROCEDURE — G0439 PPPS, SUBSEQ VISIT: HCPCS | Performed by: NURSE PRACTITIONER

## 2020-10-27 PROCEDURE — G8420 CALC BMI NORM PARAMETERS: HCPCS | Performed by: NURSE PRACTITIONER

## 2020-10-27 PROCEDURE — 1101F PT FALLS ASSESS-DOCD LE1/YR: CPT | Performed by: NURSE PRACTITIONER

## 2020-10-27 PROCEDURE — G8432 DEP SCR NOT DOC, RNG: HCPCS | Performed by: NURSE PRACTITIONER

## 2020-10-27 PROCEDURE — 3017F COLORECTAL CA SCREEN DOC REV: CPT | Performed by: NURSE PRACTITIONER

## 2020-10-27 PROCEDURE — G8536 NO DOC ELDER MAL SCRN: HCPCS | Performed by: NURSE PRACTITIONER

## 2020-10-27 PROCEDURE — G8427 DOCREV CUR MEDS BY ELIG CLIN: HCPCS | Performed by: NURSE PRACTITIONER

## 2020-10-27 RX ORDER — DICLOFENAC SODIUM 10 MG/G
4 GEL TOPICAL 4 TIMES DAILY
Qty: 50 G | Refills: 1 | Status: SHIPPED | OUTPATIENT
Start: 2020-10-27

## 2020-10-27 NOTE — PROGRESS NOTES
Komal Lacy is a 77 y.o. female  HIPAA verified by two patient identifiers. Health Maintenance Due   Topic    GLAUCOMA SCREENING Q2Y     Colorectal Cancer Screening Combo     Flu Vaccine (1)    Medicare Yearly Exam      Chief Complaint   Patient presents with   [facility] Annual Wellness Visit     Visit Vitals  /72 (BP 1 Location: Right arm, BP Patient Position: Sitting)   Pulse 69   Temp (!) 96.6 °F (35.9 °C) (Temporal)   Resp 16   Ht 5' 6.5\" (1.689 m)   Wt 155 lb (70.3 kg)   SpO2 98%   BMI 24.64 kg/m²       Pain Scale: 1/10  Pain Location: Hand (right)  1. Have you been to the ER, urgent care clinic since your last visit? Hospitalized since your last visit? No    2. Have you seen or consulted any other health care providers outside of the [address] [place] [address] since your last visit? Include any pap smears or colon screening.  No

## 2020-10-27 NOTE — PATIENT INSTRUCTIONS
1)  
Schedule of Personalized Health Plan The best way to stay healthy is to live a healthy lifestyle. A healthy lifestyle includes regular exercise, eating a well-balanced diet, keeping a healthy weight and not smoking. Regular physical exams and screening tests are another important way to take care of yourself. Preventive exams provided by health care providers can find health problems early when treatment works best and can keep you from getting certain diseases or illnesses. Preventive services include exams, lab tests, screening tests, shots, and learning information to help you take care of your own health. The CDC recommends pneumonia vaccines for anyone 72 years and older. These vaccines are usually only needed once in a lifetime unless your healthcare provider decides differently. The 2 pneumonia shots available presently are PCV 13 (Prevnar 13) and PPSV23 (Pneumovax 23). Adults 72 years or older who have not previously received PCV13, should receive a dose of PCV13 first, followed 1 year later by a dose of PPSV23. All people over 65 should have a yearly flu vaccine. People over 65 are at medium to high risk for Hepatitis B. Hepatitis B is transmitted through body fluids with a common source being sexual activity or IV drug use. Three shots are needed for complete protection. The CDC recommends the herpes zoster (shingles) vaccine for all adults 61 and older, regardless if a prior episode of zoster has been reported. In addition to your physical exam, some screening tests are recommended: 
 
Osteoporosis screening -There are no signs or symptoms of osteoporosis (weakening of bones). You might not know you have the disease until you break a bone. Thats why its so important to get a bone density test to measure your bone strength.  Bone mass measurement is taken with a Dexa scan and is recommended every two years after 72years old or if you have certain risk factors, such as personal history of vertebral fracture or chronic steroid medication use. Diabetes Mellitus screening is recommended every year. This is a blood test, called a hemoglobin A1c, which measures the average blood sugar over a 3 month period. Glaucoma is an eye disease caused by high pressure in the eye. An eye exam is recommended every year. Cardiovascular screening tests that check your cholesterol and other blood fat (lipid) levels are recommended every five years or yearly if you are on medications for cardiovascular disease. Colorectal Cancer screening tests help to find pre-cancerous polyps (growths in the colon) so they can be removed before they turn into cancer. Screening tests are recommended starting at age 48 or earlier if you have a certain risk factors, such as a family history of colon cancer. Screenings continue until you are 75. Breast Cancer screening is done with a mammogram, a low dose x-ray that looks at breast tissue. It is recommended by the [address] [place] [address] Ave that women 54 years and older get a mammogram every 2 years. After the age of 76, recommendations are based on life expectancy. Cervical Cancer screening is done by a PAP smear during a pelvic exam.  The American College of Obstetricians and Gynecologists states that screening can be discontinued after 72years old if the person has had adequate negative prior screening results and no abnormal history (abnormal = CIN2 or higher level). Here is a list of your current Health Maintenance items including a date when each one is due next: 
Health Maintenance Topic Date Due  GLAUCOMA SCREENING Q2Y  10/05/2018  Colorectal Cancer Screening Combo  10/19/2018  Flu Vaccine (1) 09/01/2020  Medicare Yearly Exam  10/25/2020  Breast Cancer Screen Mammogram  11/26/2021  Lipid Screen  12/05/2024  
 DTaP/Tdap/Td series (2 - Td) 10/04/2027  Hepatitis C Screening  Completed  Bone Densitometry (Dexa) Screening  Completed  Shingrix Vaccine Age 50>  Completed  Pneumococcal 65+ years  Completed You do not have an [address] on file 2) Diclofenac ointment - put it on thumb and see if this helps with pain 3) Labs The following blood work was ordered today. Once the tests are completed, you will receive a letter, email or phone call with the results. If you have not heard from us within 10-14 days, please call the office for the results. Complete Blood Count (CBC) helps evaluate your overall health, including hemoglobin and red blood cells that carry oxygen, white blood cells that fight infection and platelets that help with clotting. Complete Metabolic Panel (CMP) assesses electrolytes, kidney and liver function.  (A Basic Metabolic Panel (BMP) does not include liver function.) Electrolytes include sodium, potassium, calcium, and chloride. These are necessary for cell function and an imbalance can cause serious problems. BUN and creatinine are markers of kidney function. ALT and AST are markers of liver function. Hemoglobin A1c is a 3 month average of your blood sugar and used as a marker of your diabetes control. A normal value is less than 5.7%. Total Cholesterol is the total number of cholesterol particles in your blood, which includes triglycerides, HDL and LDL. A small amount of cholesterol is needed for the body's cells, hormones, and metabolism. Goal is less than 200. Triglycerides are the short term fats in your blood which are used for energy. Goal is less than 150. High Density Lipids (HDL) is the \"good\" cholesterol in your blood. HDL helps remove bad cholesterol from your blood. Goal is more than 40. Low Density Lipids (LDL) is the \"bad\" cholesterol in your blood. LDL can stick to your arteries, raising the risk for heart attack and stroke. Goal is less than 100 Urinalysis - this is a test of your urine to check your overall health. It is recommended as a part of a routine check up and screens for a variety of disorders, such as urinary tract infections, kidney disease and diabetes. Your thyroid is responsible for secreting hormones and regulating your metabolism. Thyroid Stimulating Hormone (TSH) is a test for thyroid function. TSH is a hormone made by the pituitary gland in the brain and tells your thyroid gland to make hormones and release them into your blood. If your thyroid isn't producing enough hormone, you may have symptoms such as unexplained weight gain, fatigue, hair loss. If your thyroid is producing too much hormone, you may have symptoms of diarrhea, heart palpitations, fatigue, unexplained weight loss. A normal TSH value is between 0.45 - 4.5. 4) Allergy management:  
 
1)  Take a daily antihistamine to reduce allergic symptoms such as sneezing, runny nose and itchy eyes. You can buy these over the counter (OTC = no prescription needed) such as Claritin, Allegra or Zyrtec. Take this daily as it takes 3-4 weeks for the full therapeutic effect to take place. Follow directions on package. If symptoms of sneezing, coughing and runny nose are severe, you can try taking Benadryl at night before bed. However, Benadryl can cause drowsiness, so please use caution. Elderly people should not use Benadryl due to side effects and increase risk of falling. 2)  Use an OTC decongestant nasal spray such as Flonase, Nasonex, or Rhinocort. These contain a steroid and will help reduce congestion for 3-5 days. You can spray 2x in each nostril two times a day. Use the opposite hand of the nostril you are spraying and look down at your toes when you administer the nasal spray. This ensures the spray is applied to the nasal tissue properly. If you use Afrin for nasal congestion, DO NOT use for more than 5 days (due to rebound congestion) 3) You can also use a saline nasal spray 3-4 times a day or a trung potty (never use tap water due to possible bacterial contamination) to help flush out the sinuses. This helps reduce the irritants that can increase allergic symptoms. 4)  OTC Robitussin or Delsym for cough relief. Follow directions on package. Do not exceed maximum dose. May cause drowsiness. If you have high blood pressure or kidney problems, avoid cough medicines that contain decongestants  such as pseudoephedrine, ephedrine, phenylephrine, naphazoline and oxymetazoline. 5) Warm salt water gargle can help alleviate sore throat 6) Try taking a teaspoon of local honey 2x day (but no more than 1 tablespoon a day) - to help strengthen your body's tolerance to allergens. It is important to buy local honey as the bees use plants in your area to produce their honey. Honey is also a natural cough suppressant. Eat a healthy diet, drink plenty of water and get 8 hours of sleep nightly. 4) Muscle Pain - Supportive Therapy: For acute pain: rest, intermittent application of cold packs (frozen peas make good ice pack) 10 minutes on, 10 minutes off. This reduces inflammation. After 48 hour, switch to moist heat, which brings more blood flow to the area and loosen muscles. (If it feels good, can alternate cold and moist heat in first 48 hours.) Moist heat (using the shower, bath, moist cloth, no dry heat packs) penetrates better - apply to affected area three times a day for 20 minutes to help relax the muscles. Epsom salt soak: put 2 cups of epsom salt (2 cups) in bath, if you are safe getting in and out of tub. Soak for 20 minutes. ([employer] carries a good epsom salt product by Dr. Karen Merida and spearmint for about $6) Alternatively, you can put 1 cup of epsom salt in 1 quart of warm water and soak a washcloth in solution. Apply to area of pain 2-3x day. Relative rest -  Avoid movements that stress area of injury. 
-Can use tennis balls to massage sore/tight muscles by laying on floor or using wall to apply pressure. Make sure you are using equal pressure (ie a tennis ball on either side of spine) at same time. Do not put too much pressure on areas of pain. Well Visit, Over 72: Care Instructions Your Care Instructions Physical exams can help you stay healthy. Your doctor has checked your overall health and may have suggested ways to take good care of yourself. He or she also may have recommended tests. At home, you can help prevent illness with healthy eating, regular exercise, and other steps. Follow-up care is a key part of your treatment and safety. Be sure to make and go to all appointments, and call your doctor if you are having problems. It's also a good idea to know your test results and keep a list of the medicines you take. How can you care for yourself at home? · Reach and stay at a healthy weight. This will lower your risk for many problems, such as obesity, diabetes, heart disease, and high blood pressure. · Get at least 30 minutes of exercise on most days of the week. Walking is a good choice. You also may want to do other activities, such as running, swimming, cycling, or playing tennis or team sports. · Do not smoke. Smoking can make health problems worse. If you need help quitting, talk to your doctor about stop-smoking programs and medicines. These can increase your chances of quitting for good. · Protect your skin from too much sun. When you're outdoors from 10 a.m. to 4 p.m., stay in the shade or cover up with clothing and a hat with a wide brim. Wear sunglasses that block UV rays. Even when it's cloudy, put broad-spectrum sunscreen (SPF 30 or higher) on any exposed skin. · See a dentist one or two times a year for checkups and to have your teeth cleaned. · Wear a seat belt in the car. Follow your doctor's advice about when to have certain tests. These tests can spot problems early. For men and women · Cholesterol. Your doctor will tell you how often to have this done based on your overall health and other things that can increase your risk for heart attack and stroke. · Blood pressure. Have your blood pressure checked during a routine doctor visit. Your doctor will tell you how often to check your blood pressure based on your age, your blood pressure results, and other factors. · Diabetes. Ask your doctor whether you should have tests for diabetes. · Vision. Experts recommend that you have yearly exams for glaucoma and other age-related eye problems. · Hearing. Tell your doctor if you notice any change in your hearing. You can have tests to find out how well you hear. · Colon cancer tests. Keep having colon cancer tests as your doctor recommends. You can have one of several types of tests. · Heart attack and stroke risk. At least every 4 to 6 years, you should have your risk for heart attack and stroke assessed. Your doctor uses factors such as your age, blood pressure, cholesterol, and whether you smoke or have diabetes to show what your risk for a heart attack or stroke is over the next 10 years. · Osteoporosis. Talk to your doctor about whether you should have a bone density test to find out whether you have thinning bones. Ask your doctor if you need to take a calcium plus vitamin D supplement. You may be able to get enough calcium and vitamin D through your diet. For women · Pap test and pelvic exam. You may no longer need a Pap test. Talk with your doctor about whether to stop or continue to have Pap tests. · Breast exam and mammogram. Ask how often you should have a mammogram, which is an X-ray of your breasts. A mammogram can spot breast cancer before it can be felt and when it is easiest to treat. · Thyroid disease.  Talk to your doctor about whether to have your thyroid checked as part of a regular physical exam. Women have an increased chance of a thyroid problem. For men · Prostate exam. Talk to your doctor about whether you should have a blood test (called a PSA test) for prostate cancer. Experts recommend that you discuss the benefits and risks of the test with your doctor before you decide whether to have this test. Some experts say that men ages 79 and older no longer need testing. · Abdominal aortic aneurysm. Ask your doctor whether you should have a test to check for an aneurysm. You may need a test if you ever smoked or if your parent, brother, sister, or child has had an aneurysm. When should you call for help? Watch closely for changes in your health, and be sure to contact your doctor if you have any problems or symptoms that concern you. Where can you learn more? Go to http://www.gray.com/ Enter U302 in the search box to learn more about \"Well Visit, Over 65: Care Instructions. \" Current as of: May 27, 2020               Content Version: 12.6 © 2006-2020 [employer]. Care instructions adapted under license by [employer] (which disclaims liability or warranty for this information). If you have questions about a medical condition or this instruction, always ask your healthcare professional. [address] any warranty or liability for your use of this information.

## 2020-10-27 NOTE — PROGRESS NOTES
Assessment/Plan:    1. Medicare annual wellness visit, subsequent  -discussed healthy diet and increasing daily exercise  -labs today: CBC, CMP, urinalysis, A1c, lipid, TSH  -HM: UTD    2. Screen for colon cancer  - REFERRAL TO GASTROENTEROLOGY    3. Hypothyroidism, unspecified type  -current therapy: levothyroxine 112mcg   - TSH 3RD GENERATION; Future    4. Osteoporosis  -current therapy: alendronate 35mg q7 days  -repeat Dexa 2021    5. Arthritis in right thumb  -has had corticosteroid inj w/some relief  -rx: diclofenac ointment    RTC 1 year for [address], pending lab results                Date of visit: 10/27/2020       This is an Subsequent Medicare Annual Wellness Visit (AWV), (Performed more than 12 months after effective date of Medicare Part B enrollment and 12 months after last preventive visit)    I have reviewed the patient's medical history in detail and updated the computerized patient record. Berny Florence is a 77 y.o. female   History obtained from: the patient. Patient lives: independently w/    Cognitive Impairment concerns: no    History     Patient Active Problem List   Diagnosis Code    Pure hypercholesterolemia E78.00    Osteoporosis without current pathological fracture M81.0    Hypothyroid E03.9    Scoliosis M41.9    Postmenopausal bone loss M81.0     Past Medical History:   Diagnosis Date    Adhesive capsulitis of left shoulder     per 1/12/16 note    Advance directive discussed with patient 04/18/2016    Back pain     due to curvature           11/16/15 PT notes    Bilateral bunions     Bronchitis 3/8/16    [place] [facility] notes  ?early pneumonia    Left shoulder pain 12/15/15    [facility] 1/12/16 f/u note    Menopause     LMP-early 46s?     Osteopenia     Palpitation     Pneumonia 200159    Scoliosis     sees chiro    Thyroid disease     dr locke    Vitamin D deficiency 4/16/16    rx done for 3 months      Past Surgical History:   Procedure Laterality Date    BREAST SURGERY PROCEDURE UNLISTED      left  breast fibroid    ENDOSCOPY, COLON, DIAGNOSTIC      10/01/07 repeat 10 gelrud    HX BREAST BIOPSY Right 1978    Benign surgical biopsy    HX HEENT      tonsil , septoplasty     No Known Allergies  Current Outpatient Medications   Medication Sig Dispense Refill    alendronate (FOSAMAX) 35 mg tablet TAKE 1 TAB BY MOUTH EVERY SEVEN (7) DAYS. 12 Tab 1    calcium-vits L2-K-U8-minerals 166.75 mg- 166.75 unit cap calcium      levothyroxine (SYNTHROID) 112 mcg tablet TAKE 1 TABLET BY MOUTH DAILY FOR THYROID REPLACEMENT 90 Tab 3    cholecalciferol, vitamin D3, (VITAMIN D3) 2,000 unit tab Take  by mouth daily. Family History   Problem Relation Age of Onset    Lung Disease Mother     Cancer Mother         lung cancer-heavy smoker    Lung Disease Father     Cancer Father         lung cancer--heavy smoker    Alcohol abuse Father     Alcohol abuse Brother     Diabetes Maternal Grandmother     Diabetes Maternal Grandfather     Lung Disease Paternal Grandmother     Diabetes Paternal Grandfather     Other Brother         polio    Thyroid Disease Sister      Social History     Tobacco Use    Smoking status: Never Smoker    Smokeless tobacco: Never Used   Substance Use Topics    Alcohol use: No          Specialists/Care Team   Portia Hewittry Miahue has established care with the following healthcare providers:  Patient Care Team:  Lilia Rausch NP as PCP - General (Nurse Practitioner)  Lilia Rausch NP as PCP - REHABILITATION [facility] Empaneled Provider  Herminia Grubbs MD (Endocrinology)  Manuel Thurman MD as Nurse Practitioner (Gynecology)  Jessy Stephens MD (Orthopedic Surgery)      Health Risk Assessment     Demographics   female  77 y.o. General Health Questions   -During the past 4 weeks: How would you rate your health in general? Good  How much have you been bothered by bodily pain?  Mildly - thumb  Has your physical and emotional health limited your social activities with family or friends? no    Emotional Health Questions     3 most recent PHQ Screens 10/27/2020   Little interest or pleasure in doing things Not at all   Feeling down, depressed, irritable, or hopeless Not at all   Total Score PHQ 2 0     Health Habits   Please describe your diet habits: overall healthy  Do you get 5 servings of fruits or vegetables daily? yes  Do you exercise regularly? yes    Alcohol and Tobacco Risk Factor Screening:     Social History     Tobacco Use   Smoking Status Never Smoker   Smokeless Tobacco Never Used     Social History     Substance and Sexual Activity   Alcohol Use No         Activities of Daily Living and Functional Status   Do you need help with eating, walking, dressing, bathing, toileting, the phone, transportation, shopping, preparing meals, housework, laundry, or medications:  no  -Do you need help managing money? no  -In the past four weeks, was someone available to help you if you needed and wanted help with anything? yes  -Are you confident are you that you can control and manage most of your health problems? yes  -Have you been given information to help you keep track of your medications? yes  -How often do you have trouble taking your medications as prescribed? never    Hearing Loss   Have you noticed any hearing difficulties? Yes - has tinnitus    Fall Risk and Home Safety   How often have you been bothered by feeling dizzy when standing up? never  Have you fallen 2 or more times in the past year? yes  Does your home have good lighting, grab bars in the bathroom, handrails on the   stairs, good lighting? yes  Does you home have throw rugs on floor or clutter you can trip over? no  Do you have smoke detectors and check them regularly? yes  Do you have difficulties driving a car? no  Do you always fasten your seat belt when you are in a car? yes  Fall Risk Assessment:    Fall Risk Assessment, last 12 mths 10/27/2020   Able to walk?  Yes   Fall in past 15 months? No   Fall with injury? -   Number of falls in past 12 months -   Fall Risk Score -         Abuse Screen   Patient is not abused    Evaluation of Cognitive Function   Mood/affect:  happy  Orientation: Person, Place, Time and Situation  Appearance: age appropriate  Family member/caregiver input: none    Physical Examination     Vitals:    10/27/20 0932   BP: 112/72   Pulse: 69   Resp: 16   Temp: (!) 96.6 °F (35.9 °C)   TempSrc: Temporal   SpO2: 98%   Weight: 155 lb (70.3 kg)   Height: 5' 6.5\" (1.689 m)     Body mass index is 24.64 kg/m². No exam data present  Patient's timed Up & Go test steady or shorter than 30 seconds? yes      Advice/Referrals/Counseling (as indicated)   Education and counseling provided for any problems identified above:   Screenings   Vaccines  Annual Flu shot  Healthy eating  Daily exercise    Preventive Services       (Preventive care checklist to be included in patient instructions)  Discussed today Done Previously Not Needed    x   Glaucoma screening   x   Colorectal cancer screening (colonoscopy)    2019 - oporosis  Osteoporosis Screening (DEXA scan)    2019  Breast cancer Screening (Mammogram)     x Cervical cancer Screening (Pap smear)     x Prostate cancer Screening   x   Cardiovascular Screening (Lipid panel)     x Diabetes screening test (Hgb A1c)     x Abdominal ultrasound for AAA (65-75 male smokers)     x Low-dose CT for lung cancer screening    2020  Flu vaccine     x Hepatitis B vaccine (if at risk)    2019  2018  Pneumococcal 23  Prevnar 13    2017  Tdap vaccine    2020  Shingles vaccine    2013  Screening for Hepatitis C (b [id number])     Discussion of Advance Directive     Patient was offered the opportunity to discuss advance care planning:  yes     Does patient have an Advance Directive:  no   If no, did you provide information on Caring Connections?   yes       Assessment/Plan   Z00.00    Anxiety d/t covid and politics - advised to     Walking 2 mi every morning, riding bike 20mi on Sunday     Right Hand - had glass removed from 2nd digit - still has bump there       RESP: Unlabored without SOB. Speaking in full sentences. Breath sounds are symmetrical bilaterally. Clear to auscultation bilaterally anteriorly and posteriorly. No wheeze, crackles or rhonchi noted. CV: normal rate. Regular rhythm. S1, S2 audible. No murmur noted. No rubs, clicks or gallops noted.

## 2020-11-15 NOTE — TELEPHONE ENCOUNTER
Patient was already sched.
She is due in for annual visit and full fasting lab within the next 30 days. You can offer a CHP but she will probably decline this.  Refill pended to Dr Rebecca Ahmadi for Monday ok
yes

## 2022-03-18 PROBLEM — M81.0 POSTMENOPAUSAL BONE LOSS: Status: ACTIVE | Noted: 2017-10-25

## 2022-03-21 ENCOUNTER — TRANSCRIBE ORDER (OUTPATIENT)
Dept: SCHEDULING | Age: 67
End: 2022-03-21

## 2022-03-21 DIAGNOSIS — M81.0 AGE-RELATED OSTEOPOROSIS WITHOUT CURRENT PATHOLOGICAL FRACTURE: Primary | ICD-10-CM

## 2022-03-21 DIAGNOSIS — Z00.00 PREVENTATIVE HEALTH CARE: Primary | ICD-10-CM

## 2022-04-19 ENCOUNTER — HOSPITAL ENCOUNTER (OUTPATIENT)
Dept: CT IMAGING | Age: 67
Discharge: HOME | End: 2022-04-19
Attending: INTERNAL MEDICINE
Payer: SELF-PAY

## 2022-04-19 ENCOUNTER — HOSPITAL ENCOUNTER (OUTPATIENT)
Dept: MAMMOGRAPHY | Age: 67
Discharge: HOME | End: 2022-04-19
Attending: OBSTETRICS & GYNECOLOGY
Payer: MEDICARE

## 2022-04-19 DIAGNOSIS — Z00.00 PREVENTATIVE HEALTH CARE: ICD-10-CM

## 2022-04-19 DIAGNOSIS — M81.0 AGE-RELATED OSTEOPOROSIS WITHOUT CURRENT PATHOLOGICAL FRACTURE: ICD-10-CM

## 2022-04-19 PROCEDURE — 77080 DXA BONE DENSITY AXIAL: CPT

## 2022-04-19 PROCEDURE — 75571 CT HRT W/O DYE W/CA TEST: CPT

## 2022-04-27 NOTE — CARDIO/PULMONARY
Reached patient at her given mobile number. Patient states she has seen her coronary artery CT score of zero on [employer]. Discussed the meaning of this score and patient questions. Patient has no further questions at this time.

## 2022-12-08 ENCOUNTER — TRANSCRIBE ORDER (OUTPATIENT)
Dept: SCHEDULING | Age: 67
End: 2022-12-08

## 2022-12-08 DIAGNOSIS — Z12.31 ENCOUNTER FOR MAMMOGRAM TO ESTABLISH BASELINE MAMMOGRAM: Primary | ICD-10-CM

## 2023-02-15 ENCOUNTER — HOSPITAL ENCOUNTER (OUTPATIENT)
Dept: MAMMOGRAPHY | Age: 68
Discharge: HOME | End: 2023-02-15
Attending: INTERNAL MEDICINE
Payer: MEDICARE

## 2023-02-15 DIAGNOSIS — Z12.31 ENCOUNTER FOR MAMMOGRAM TO ESTABLISH BASELINE MAMMOGRAM: ICD-10-CM

## 2023-02-15 PROCEDURE — 77063 BREAST TOMOSYNTHESIS BI: CPT

## 2023-04-14 ENCOUNTER — HOSPITAL ENCOUNTER (OUTPATIENT)
Age: 68
Setting detail: OUTPATIENT SURGERY
Discharge: HOME | End: 2023-04-14
Attending: INTERNAL MEDICINE | Admitting: INTERNAL MEDICINE
Payer: MEDICARE

## 2023-04-14 ENCOUNTER — ANESTHESIA (OUTPATIENT)
Dept: ENDOSCOPY | Age: 68
End: 2023-04-14
Payer: MEDICARE

## 2023-04-14 ENCOUNTER — ANESTHESIA EVENT (OUTPATIENT)
Dept: ENDOSCOPY | Age: 68
End: 2023-04-14
Payer: MEDICARE

## 2023-04-14 VITALS
BODY MASS INDEX: 22.91 KG/M2 | TEMPERATURE: 97.3 F | DIASTOLIC BLOOD PRESSURE: 80 MMHG | RESPIRATION RATE: 22 BRPM | SYSTOLIC BLOOD PRESSURE: 141 MMHG | HEIGHT: 67 IN | OXYGEN SATURATION: 99 % | WEIGHT: 146 LBS | HEART RATE: 70 BPM

## 2023-04-14 PROCEDURE — 88305 TISSUE EXAM BY PATHOLOGIST: CPT

## 2023-04-14 PROCEDURE — 74011250636 HC RX REV CODE- 250/636: Performed by: NURSE ANESTHETIST, CERTIFIED REGISTERED

## 2023-04-14 PROCEDURE — 76040000007: Performed by: INTERNAL MEDICINE

## 2023-04-14 PROCEDURE — 77030010936 HC CLP LIG BSC -C: Performed by: INTERNAL MEDICINE

## 2023-04-14 PROCEDURE — 74011250637 HC RX REV CODE- 250/637: Performed by: INTERNAL MEDICINE

## 2023-04-14 PROCEDURE — 77030040684 HC NDL ENDOSC NEEDLEMASTR OCOA -B: Performed by: INTERNAL MEDICINE

## 2023-04-14 PROCEDURE — 2709999900 HC NON-CHARGEABLE SUPPLY: Performed by: INTERNAL MEDICINE

## 2023-04-14 PROCEDURE — 74011000250 HC RX REV CODE- 250: Performed by: NURSE ANESTHETIST, CERTIFIED REGISTERED

## 2023-04-14 PROCEDURE — 77030013992 HC SNR POLYP ENDOSC BSC -B: Performed by: INTERNAL MEDICINE

## 2023-04-14 PROCEDURE — 76060000032 HC ANESTHESIA 0.5 TO 1 HR: Performed by: INTERNAL MEDICINE

## 2023-04-14 PROCEDURE — 77030038665 HC SOL ELEVIEW INJ AGNT ARPH -B: Performed by: INTERNAL MEDICINE

## 2023-04-14 DEVICE — WORKING LENGTH 235CM, WORKING CHANNEL 2.8MM
Type: IMPLANTABLE DEVICE | Site: ASCENDING COLON | Status: FUNCTIONAL
Brand: RESOLUTION 360 CLIP

## 2023-04-14 RX ORDER — SODIUM CHLORIDE 0.9 % (FLUSH) 0.9 %
5-40 SYRINGE (ML) INJECTION AS NEEDED
Status: DISCONTINUED | OUTPATIENT
Start: 2023-04-14 | End: 2023-04-14 | Stop reason: HOSPADM

## 2023-04-14 RX ORDER — SODIUM CHLORIDE 9 MG/ML
INJECTION, SOLUTION INTRAVENOUS
Status: DISCONTINUED | OUTPATIENT
Start: 2023-04-14 | End: 2023-04-14 | Stop reason: HOSPADM

## 2023-04-14 RX ORDER — SODIUM CHLORIDE 0.9 % (FLUSH) 0.9 %
5-40 SYRINGE (ML) INJECTION EVERY 8 HOURS
Status: DISCONTINUED | OUTPATIENT
Start: 2023-04-14 | End: 2023-04-14 | Stop reason: HOSPADM

## 2023-04-14 RX ORDER — AMPICILLIN TRIHYDRATE 250 MG
600 CAPSULE ORAL
COMMUNITY

## 2023-04-14 RX ORDER — MIDAZOLAM HYDROCHLORIDE 1 MG/ML
.25-5 INJECTION, SOLUTION INTRAMUSCULAR; INTRAVENOUS
Status: DISCONTINUED | OUTPATIENT
Start: 2023-04-14 | End: 2023-04-14 | Stop reason: HOSPADM

## 2023-04-14 RX ORDER — CHOLECALCIFEROL (VITAMIN D3) 50 MCG
CAPSULE ORAL
COMMUNITY

## 2023-04-14 RX ORDER — SODIUM CHLORIDE 9 MG/ML
150 INJECTION, SOLUTION INTRAVENOUS CONTINUOUS
Status: DISCONTINUED | OUTPATIENT
Start: 2023-04-14 | End: 2023-04-14 | Stop reason: HOSPADM

## 2023-04-14 RX ORDER — LIDOCAINE HYDROCHLORIDE 20 MG/ML
INJECTION, SOLUTION EPIDURAL; INFILTRATION; INTRACAUDAL; PERINEURAL AS NEEDED
Status: DISCONTINUED | OUTPATIENT
Start: 2023-04-14 | End: 2023-04-14 | Stop reason: HOSPADM

## 2023-04-14 RX ORDER — EPINEPHRINE 0.1 MG/ML
1 INJECTION INTRACARDIAC; INTRAVENOUS
Status: DISCONTINUED | OUTPATIENT
Start: 2023-04-14 | End: 2023-04-14 | Stop reason: HOSPADM

## 2023-04-14 RX ORDER — FENTANYL CITRATE 50 UG/ML
200 INJECTION, SOLUTION INTRAMUSCULAR; INTRAVENOUS
Status: DISCONTINUED | OUTPATIENT
Start: 2023-04-14 | End: 2023-04-14 | Stop reason: HOSPADM

## 2023-04-14 RX ORDER — ATROPINE SULFATE 0.1 MG/ML
0.5 INJECTION INTRAVENOUS
Status: DISCONTINUED | OUTPATIENT
Start: 2023-04-14 | End: 2023-04-14 | Stop reason: HOSPADM

## 2023-04-14 RX ORDER — PROPOFOL 10 MG/ML
INJECTION, EMULSION INTRAVENOUS AS NEEDED
Status: DISCONTINUED | OUTPATIENT
Start: 2023-04-14 | End: 2023-04-14 | Stop reason: HOSPADM

## 2023-04-14 RX ORDER — DEXTROMETHORPHAN/PSEUDOEPHED 2.5-7.5/.8
1.2 DROPS ORAL
Status: DISCONTINUED | OUTPATIENT
Start: 2023-04-14 | End: 2023-04-14 | Stop reason: HOSPADM

## 2023-04-14 RX ADMIN — PROPOFOL 30 MG: 10 INJECTION, EMULSION INTRAVENOUS at 14:37

## 2023-04-14 RX ADMIN — PROPOFOL 25 MG: 10 INJECTION, EMULSION INTRAVENOUS at 14:55

## 2023-04-14 RX ADMIN — PROPOFOL 25 MG: 10 INJECTION, EMULSION INTRAVENOUS at 14:53

## 2023-04-14 RX ADMIN — PROPOFOL 25 MG: 10 INJECTION, EMULSION INTRAVENOUS at 14:46

## 2023-04-14 RX ADMIN — PROPOFOL 25 MG: 10 INJECTION, EMULSION INTRAVENOUS at 14:57

## 2023-04-14 RX ADMIN — PROPOFOL 20 MG: 10 INJECTION, EMULSION INTRAVENOUS at 14:41

## 2023-04-14 RX ADMIN — SODIUM CHLORIDE: 900 INJECTION, SOLUTION INTRAVENOUS at 14:06

## 2023-04-14 RX ADMIN — PROPOFOL 30 MG: 10 INJECTION, EMULSION INTRAVENOUS at 14:29

## 2023-04-14 RX ADMIN — PROPOFOL 25 MG: 10 INJECTION, EMULSION INTRAVENOUS at 14:51

## 2023-04-14 RX ADMIN — PROPOFOL 30 MG: 10 INJECTION, EMULSION INTRAVENOUS at 14:32

## 2023-04-14 RX ADMIN — PROPOFOL 30 MG: 10 INJECTION, EMULSION INTRAVENOUS at 14:35

## 2023-04-14 RX ADMIN — SODIUM CHLORIDE: 900 INJECTION, SOLUTION INTRAVENOUS at 14:45

## 2023-04-14 RX ADMIN — PROPOFOL 30 MG: 10 INJECTION, EMULSION INTRAVENOUS at 14:27

## 2023-04-14 RX ADMIN — PROPOFOL 100 MG: 10 INJECTION, EMULSION INTRAVENOUS at 14:22

## 2023-04-14 RX ADMIN — PROPOFOL 25 MG: 10 INJECTION, EMULSION INTRAVENOUS at 14:50

## 2023-04-14 RX ADMIN — LIDOCAINE HYDROCHLORIDE 50 MG: 20 INJECTION, SOLUTION EPIDURAL; INFILTRATION; INTRACAUDAL; PERINEURAL at 14:22

## 2023-04-14 RX ADMIN — PROPOFOL 30 MG: 10 INJECTION, EMULSION INTRAVENOUS at 14:24

## 2023-04-14 RX ADMIN — PROPOFOL 25 MG: 10 INJECTION, EMULSION INTRAVENOUS at 14:45

## 2023-04-14 RX ADMIN — PROPOFOL 25 MG: 10 INJECTION, EMULSION INTRAVENOUS at 14:48

## 2023-04-14 NOTE — PROCEDURES
Sukh Gutierrez 912 Nakul Peña M.D.  Ailyn Olson, [address] [facility]  [phone]               Colonoscopy Procedure Note    NAME: Jessica Hammer  :  1955  MRN:  [id number]    Indications:   Screening colonoscopy     : Justin Jacobsen MD    Referring Provider:  Leatha Lang NP    Staff: Endoscopy Yonny Em: Gee Weeks  Endoscopy RN-1: Rehan Richards RN  Endoscopy RN-2: Ben Hull RN    Prosthetic devices, grafts, tissues, transplant, or devices implanted: none    Medicines:  MAC anesthesia      Procedure Details:  After informed consent was obtained with all risks and benefits of the procedure explained and preprocedure exam completed, the patient was placed in the left lateral decubitus position. Universal protocol for patient identification was performed and documented in the nursing notes. Throughout the procedure, the patient's blood pressure was monitored at least every five minutes; pulse, and oxygen saturations were monitored continuously. All vital signs were documented in the nursing notes. A digital rectal exam was performed and was normal.  The Olympus videocolonoscope  was inserted in the rectum and carefully advanced to the cecum, which was identified by the ileocecal valve and appendiceal orifice. The colonoscope was slowly withdrawn with careful evaluation between folds. Retroflexion in the rectum was performed; findings and interventions are described below. Procedure start time, extent reached time/cecum time, and procedure end time are documented in the nursing notes. The quality of preparation was adequate.        Findings:   2 sessile polyps with greatest diameter of 10 mm (1 in the ascending and 1 in the transverse colon) s/p Eleview lift to delineate the borders of the polyp and s/p single piece hot snare polypectomy  3 sessile polyps with greatest diameter of 6 mm (2 in the ascending, 1 in the sigmoid colon) s/p cold snare polypectomy  Mild sigmoid diverticulosis    Interventions:    5 complete polypectomy were performed using hot snare /cold snare and the polyps were  retrieved    Specimens:   ID Type Source Tests Collected by Time Destination   1 : ascending colon polyps, 3 Preservative Colon, Ascending  Ria Ortiz MD 4/14/2023 1435 Pathology   2 : transverse colon polyp Preservative Colon, Transverse  Ria Ortiz MD 4/14/2023 1445 Pathology   3 : Sigmoid Colon Polyp Preservative Sigmoid  Ria Ortiz MD 4/14/2023 1456 Pathology       EBL:  None. Complications:   No immediate complications     Impression:  -See post-procedure diagnoses. Recommendations:   -If adenoma is present, repeat colonoscopy in 3-5 years based on pathology. If < 10 years, reason:  above average risk patient    No NSAIDs for 2 days      Signed by:  Odette Oppenheim, MD          4/14/2023  3:04 PM

## 2023-04-14 NOTE — H&P
[address] [place] [address], [address] [place] [address]          Pre-procedure History and Physical       NAME:  Festus Nair   :   1955   MRN:   [id number]     CHIEF COMPLAINT/HPI: crcs    PMH:  Past Medical History:   Diagnosis Date    Adhesive capsulitis of left shoulder     per 16 note    Advance directive discussed with patient 2016    Back pain     due to curvature           11/16/15 PT notes    Bilateral bunions     Bronchitis 3/8/16    [place] [facility] notes  ?early pneumonia    Left shoulder pain 12/15/15    [facility] 16 f/u note    Menopause     LMP-early 46s? Osteopenia     Palpitation     Pneumonia     Scoliosis     sees chiro    Thyroid disease     dr locke    Vitamin D deficiency 16    rx done for 3 months       PSH:  Past Surgical History:   Procedure Laterality Date    ENDOSCOPY, COLON, DIAGNOSTIC      10/01/07 repeat 10 gelrud    HX BREAST BIOPSY Right 1978    Benign surgical biopsy    HX HEENT      tonsil , septoplasty    OK UNLISTED PROCEDURE BREAST      left  breast fibroid       Allergies:  No Known Allergies    Home Medications:  Prior to Admission Medications   Prescriptions Last Dose Informant Patient Reported? Taking? B.animalis,bifid,infantis,long (Probiotic 4X) 10-15 mg TbEC 2023  Yes Yes   Sig: Take  by mouth. FLAXSEED OIL PO 2023  Yes Yes   Sig: Take  by mouth. alendronate (FOSAMAX) 35 mg tablet 2023  No Yes   Sig: TAKE 1 TABLET BY MOUTH EVERY SEVEN (7) DAYS. PCP NO LONGER AT THIS PRACTICE - MUST ESTABLISH WITH NEW PCP FOR FUTURE REFILLS. cholecalciferol, vitamin D3, 50 mcg (2,000 unit) tab 2023  Yes Yes   Sig: Take  by mouth daily. levothyroxine (SYNTHROID) 112 mcg tablet 2023  No Yes   Sig: TAKE 1 TABLET BY MOUTH DAILY FOR THYROID REPLACEMENT   red yeast rice extract 600 mg cap 2023  Yes Yes   Sig: Take 1 Capsule by mouth now.    vit C/E/Zn/coppr/lutein/zeaxan (PRESERVISION AREDS-2 PO) 4/7/2023  Yes Yes   Sig: Take  by mouth. Facility-Administered Medications: None       Hospital Medications:  Current Facility-Administered Medications   Medication Dose Route Frequency    0.9% sodium chloride infusion  150 mL/hr IntraVENous CONTINUOUS    sodium chloride (NS) flush 5-40 mL  5-40 mL IntraVENous Q8H    sodium chloride (NS) flush 5-40 mL  5-40 mL IntraVENous PRN    midazolam (VERSED) injection 0.25-5 mg  0.25-5 mg IntraVENous Multiple    fentaNYL citrate (PF) injection 200 mcg  200 mcg IntraVENous Multiple    simethicone (MYLICON) 61VX/9.9WD oral drops 80 mg  1.2 mL Oral Multiple    atropine injection 0.5 mg  0.5 mg IntraVENous ONCE PRN    EPINEPHrine (ADRENALIN) 0.1 mg/mL syringe 1 mg  1 mg Endoscopically ONCE PRN       Family History:  Family History   Problem Relation Age of Onset    Lung Disease Mother     Cancer Mother         lung cancer-heavy smoker    Lung Disease Father     Cancer Father         lung cancer--heavy smoker    Alcohol abuse Father     Alcohol abuse Brother     Diabetes Maternal Grandmother     Diabetes Maternal Grandfather     Lung Disease Paternal Grandmother     Diabetes Paternal Grandfather     Other Brother         polio    Thyroid Disease Sister        Social History:  Social History     Tobacco Use    Smoking status: Never    Smokeless tobacco: Never   Substance Use Topics    Alcohol use: Not Currently     Comment: rare       The patient was counseled at length about the risks of lucas Covid-19 in the skinny-operative and post-operative states including the recovery window of their procedure. The patient was made aware that lucas Covid-19 after a surgical procedure may worsen their prognosis for recovering from the virus and lend to a higher morbidity and or mortality risk. The patient was given the options of postponing their procedure. All of the risks, benefits, and alternatives were discussed.  The patient does  wish to proceed with the procedure. PHYSICAL EXAM PRIOR TO SEDATION:  General: Alert, in no acute distress    Lungs:            CTA bilaterally  Heart:  Normal S1, S2    Abdomen: Soft, Non distended, Non tender. Normoactive bowel sounds. Assessment:   Stable for sedation administration.   Date of last colonoscopy: 2007, Polyps  No    Plan:     Endoscopic procedure with sedation     Signed By: Shane Diaz MD     4/14/2023  2:23 PM

## 2023-04-14 NOTE — PERIOP NOTES
.Patient has been evaluated by anesthesia pre-procedure. Patient alert and oriented. Vital signs will not be charted by the Endoscopy nurse. All vitals, airway, and loc are monitored by anesthesia staff throughout procedure.       .Endoscope was pre-cleaned at bedside immediately following procedure by [facility]

## 2023-04-14 NOTE — ANESTHESIA PREPROCEDURE EVALUATION
Relevant Problems   No relevant active problems       Anesthetic History   No history of anesthetic complications            Review of Systems / Medical History  Patient summary reviewed, nursing notes reviewed and pertinent labs reviewed    Pulmonary  Within defined limits                 Neuro/Psych   Within defined limits           Cardiovascular  Within defined limits                     GI/Hepatic/Renal  Within defined limits              Endo/Other      Hypothyroidism       Other Findings              Physical Exam    Airway  Mallampati: II  TM Distance: 4 - 6 cm  Neck ROM: normal range of motion   Mouth opening: Normal     Cardiovascular    Rhythm: regular  Rate: normal         Dental  No notable dental hx       Pulmonary  Breath sounds clear to auscultation               Abdominal  Abdominal exam normal       Other Findings            Anesthetic Plan    ASA: 2  Anesthesia type: MAC            Anesthetic plan and risks discussed with: Patient

## 2023-04-14 NOTE — ANESTHESIA POSTPROCEDURE EVALUATION
Procedure(s):  COLONOSCOPY  ENDOSCOPIC POLYPECTOMY  INJECTION  RESOLUTION CLIP. MAC    Anesthesia Post Evaluation        Patient participation: complete - patient participated  Level of consciousness: awake  Pain management: adequate  Airway patency: patent  Anesthetic complications: no  Cardiovascular status: hemodynamically stable  Respiratory status: acceptable  Hydration status: acceptable  Comments: The patient is ready for PACU discharge. Beba Bird DO                   Post anesthesia nausea and vomiting:  controlled      INITIAL Post-op Vital signs:   Vitals Value Taken Time   /66 04/14/23 1507   Temp 36.3 °C (97.3 °F) 04/14/23 1507   Pulse 79 04/14/23 1509   Resp 20 04/14/23 1509   SpO2 96 % 04/14/23 1509   Vitals shown include unvalidated device data.

## 2023-04-14 NOTE — DISCHARGE INSTRUCTIONS
Sukh Zamudio 912 Gilberto Arriaga M.D.  [facility], [address] [facility]  [phone]          COLONOSCOPY [facility]  [id number]  1955    DISCOMFORT:  Redness at IV site- apply warm compress to area; if redness or soreness persist- contact your physician  There may be a slight amount of blood passed from the rectum  Gaseous discomfort- walking, belching will help relieve any discomfort  You may not operate a vehicle for 12 hours  You may not engage in an occupation involving machinery or appliances for the  rest of today  You may not drink alcoholic beverages for at least 12 hours  Avoid making any critical decisions for at least 24 hours    DIET:   You may resume your normal diet, but some patients find that heavy or large  meals may lead to indigestion or vomiting. I suggest a light meal as first food  intake. I recommend a whole food, plant-based diet for your overall health. ACTIVITY:  You may resume your normal daily activities. It is recommended that you spend the remainder of the day resting - avoid any strenuous activity. CALL M.D. IF ANY SIGN OF:   Increasing pain, nausea, vomiting  Abdominal distension (swelling)  Significant bleeding (oral or rectal)  Fever   Pain in chest area  Shortness of breath    Additional Instructions:   Call Dr. Gilberto Arriaga if any questions or problems at [phone]   You should receive the biopsy results by phone or mail within 3 weeks, if not, call  my office for the results      Should have a repeat colonoscopy in 3-5 years based on pathology. Colonoscopy showed 5 polyps removed and diverticulosis. No NSAIDs for 2 days.

## 2024-07-03 ENCOUNTER — HOSPITAL ENCOUNTER (OUTPATIENT)
Facility: HOSPITAL | Age: 69
Discharge: HOME | End: 2024-07-06
Payer: MEDICARE

## 2024-07-03 VITALS — BODY MASS INDEX: 23.54 KG/M2 | HEIGHT: 67 IN | WEIGHT: 150 LBS

## 2024-07-03 DIAGNOSIS — M80.00XS AGE-RELATED OSTEOPOROSIS WITH CURRENT PATHOLOGICAL FRACTURE, SEQUELA: ICD-10-CM

## 2024-07-03 PROCEDURE — 77080 DXA BONE DENSITY AXIAL: CPT

## 2024-10-30 ENCOUNTER — APPOINTMENT (OUTPATIENT)
Facility: HOSPITAL | Age: 69
End: 2024-10-30
Payer: MEDICARE

## 2024-10-30 ENCOUNTER — HOSPITAL ENCOUNTER (EMERGENCY)
Facility: HOSPITAL | Age: 69
Discharge: HOME | End: 2024-10-31
Attending: EMERGENCY MEDICINE
Payer: MEDICARE

## 2024-10-30 DIAGNOSIS — R11.2 NAUSEA AND VOMITING, UNSPECIFIED VOMITING TYPE: Primary | ICD-10-CM

## 2024-10-30 DIAGNOSIS — K59.00 CONSTIPATION, UNSPECIFIED CONSTIPATION TYPE: ICD-10-CM

## 2024-10-30 DIAGNOSIS — R10.817 GENERALIZED ABDOMINAL TENDERNESS WITHOUT REBOUND TENDERNESS: ICD-10-CM

## 2024-10-30 LAB
ALBUMIN SERPL-MCNC: 3.8 G/DL (ref 3.5–5)
ALBUMIN/GLOB SERPL: 1 (ref 1.1–2.2)
ALP SERPL-CCNC: 116 U/L (ref 45–117)
ALT SERPL-CCNC: 24 U/L (ref 12–78)
ANION GAP SERPL CALC-SCNC: 5 MMOL/L (ref 2–12)
AST SERPL-CCNC: 17 U/L (ref 15–37)
BASOPHILS # BLD: 0 K/UL (ref 0–0.1)
BASOPHILS NFR BLD: 0 % (ref 0–1)
BILIRUB SERPL-MCNC: 0.8 MG/DL (ref 0.2–1)
BUN SERPL-MCNC: 12 MG/DL (ref 6–20)
BUN/CREAT SERPL: 15 (ref 12–20)
CALCIUM SERPL-MCNC: 9.1 MG/DL (ref 8.5–10.1)
CHLORIDE SERPL-SCNC: 104 MMOL/L (ref 97–108)
CO2 SERPL-SCNC: 27 MMOL/L (ref 21–32)
COMMENT:: NORMAL
CREAT SERPL-MCNC: 0.81 MG/DL (ref 0.55–1.02)
DIFFERENTIAL METHOD BLD: ABNORMAL
EOSINOPHIL # BLD: 0.3 K/UL (ref 0–0.4)
EOSINOPHIL NFR BLD: 4 % (ref 0–7)
ERYTHROCYTE [DISTWIDTH] IN BLOOD BY AUTOMATED COUNT: 12.2 % (ref 11.5–14.5)
GLOBULIN SER CALC-MCNC: 3.9 G/DL (ref 2–4)
GLUCOSE SERPL-MCNC: 116 MG/DL (ref 65–100)
HCT VFR BLD AUTO: 38.6 % (ref 35–47)
HGB BLD-MCNC: 12.7 G/DL (ref 11.5–16)
IMM GRANULOCYTES # BLD AUTO: 0 K/UL (ref 0–0.04)
IMM GRANULOCYTES NFR BLD AUTO: 0 % (ref 0–0.5)
LIPASE SERPL-CCNC: 34 U/L (ref 13–75)
LYMPHOCYTES # BLD: 0.4 K/UL (ref 0.8–3.5)
LYMPHOCYTES NFR BLD: 6 % (ref 12–49)
MCH RBC QN AUTO: 31 PG (ref 26–34)
MCHC RBC AUTO-ENTMCNC: 32.9 G/DL (ref 30–36.5)
MCV RBC AUTO: 94.1 FL (ref 80–99)
MONOCYTES # BLD: 0.5 K/UL (ref 0–1)
MONOCYTES NFR BLD: 7 % (ref 5–13)
NEUTS SEG # BLD: 6 K/UL (ref 1.8–8)
NEUTS SEG NFR BLD: 83 % (ref 32–75)
NRBC # BLD: 0 K/UL (ref 0–0.01)
NRBC BLD-RTO: 0 PER 100 WBC
PLATELET # BLD AUTO: 241 K/UL (ref 150–400)
PMV BLD AUTO: 9.4 FL (ref 8.9–12.9)
POTASSIUM SERPL-SCNC: 3.8 MMOL/L (ref 3.5–5.1)
PROT SERPL-MCNC: 7.7 G/DL (ref 6.4–8.2)
RBC # BLD AUTO: 4.1 M/UL (ref 3.8–5.2)
RBC MORPH BLD: ABNORMAL
SODIUM SERPL-SCNC: 136 MMOL/L (ref 136–145)
SPECIMEN HOLD: NORMAL
WBC # BLD AUTO: 7.2 K/UL (ref 3.6–11)

## 2024-10-30 PROCEDURE — 36415 COLL VENOUS BLD VENIPUNCTURE: CPT

## 2024-10-30 PROCEDURE — 83690 ASSAY OF LIPASE: CPT

## 2024-10-30 PROCEDURE — 99285 EMERGENCY DEPT VISIT HI MDM: CPT

## 2024-10-30 PROCEDURE — 80053 COMPREHEN METABOLIC PANEL: CPT

## 2024-10-30 PROCEDURE — 6360000004 HC RX CONTRAST MEDICATION: Performed by: RADIOLOGY

## 2024-10-30 PROCEDURE — 74177 CT ABD & PELVIS W/CONTRAST: CPT

## 2024-10-30 PROCEDURE — 85025 COMPLETE CBC W/AUTO DIFF WBC: CPT

## 2024-10-30 RX ORDER — IOPAMIDOL 755 MG/ML
100 INJECTION, SOLUTION INTRAVASCULAR
Status: COMPLETED | OUTPATIENT
Start: 2024-10-30 | End: 2024-10-30

## 2024-10-30 RX ORDER — ONDANSETRON 2 MG/ML
4 INJECTION INTRAMUSCULAR; INTRAVENOUS ONCE
Status: DISCONTINUED | OUTPATIENT
Start: 2024-10-30 | End: 2024-10-31

## 2024-10-30 RX ADMIN — IOPAMIDOL 100 ML: 755 INJECTION, SOLUTION INTRAVENOUS at 23:46

## 2024-10-30 ASSESSMENT — PAIN - FUNCTIONAL ASSESSMENT: PAIN_FUNCTIONAL_ASSESSMENT: NONE - DENIES PAIN

## 2024-10-31 VITALS
SYSTOLIC BLOOD PRESSURE: 127 MMHG | DIASTOLIC BLOOD PRESSURE: 69 MMHG | TEMPERATURE: 98.4 F | HEART RATE: 76 BPM | HEIGHT: 67 IN | OXYGEN SATURATION: 97 % | WEIGHT: 149.69 LBS | BODY MASS INDEX: 23.49 KG/M2 | RESPIRATION RATE: 18 BRPM

## 2024-10-31 RX ORDER — MAGNESIUM CITRATE
150 SOLUTION, ORAL ORAL ONCE
Qty: 1 EACH | Refills: 0 | Status: SHIPPED | OUTPATIENT
Start: 2024-10-31 | End: 2024-10-31

## 2024-10-31 NOTE — ED PROVIDER NOTES
[facility] EMERGENCY DEP  EMERGENCY DEPARTMENT ENCOUNTER      Pt Name: Mai Dutta  MRN: [id number]  Birthdate 1955  Date of evaluation: 10/30/2024  Provider: Larry Ochoa PA-C    CHIEF COMPLAINT       Chief Complaint   Patient presents with    Abdominal Pain    Vomiting    Nausea         HISTORY OF PRESENT ILLNESS    Patient is a 69-year-old female with history of back pain, bronchitis, left shoulder pain, osteopenia, thyroid disease and vitamin D deficiency who presents emergency room with reports of nausea, vomiting, diarrhea, and abdominal pain that started last night.  Patient reports that she went to dinner and after that the symptoms started.  Patient reports she is not sure if she had food poisoning or wet.  Patient reports that she does have a history of an inguinal hernia that does not bother her and that was never repaired.  No other abdominal surgeries.  Patient reports she does not take a lot of medicines at home.  Patient reports [facility] and was told to come to the emergency room for further evaluation and possible IV fluids. Patient denies chest pain, shortness of breath, urinary symptoms, constipation, headache, dizziness, lightheadedness, fever or chills.  Patient denies alcohol use, denies smoking/vaping or illicit drug use.          Nursing Notes were reviewed.    REVIEW OF SYSTEMS       Review of Systems      PAST MEDICAL HISTORY     Past Medical History:   Diagnosis Date    Adhesive capsulitis of left shoulder     per 1/12/16 note    Advance directive discussed with patient 04/18/2016    Back pain     due to curvature           11/16/15 PT notes    Bilateral bunions     Bronchitis 3/8/16    [place] [facility] notes  ?early pneumonia    Left shoulder pain 12/15/15    Advanced  [facility] 1/12/16 f/u note    Menopause     LMP-early 50s?    Osteopenia     Palpitation     Pneumonia [id number]    Thyroid disease     dr sullivan    Vitamin D deficiency 4/16/16    rx done for 3 months

## 2024-10-31 NOTE — DISCHARGE INSTRUCTIONS
Discussed visit today.  Please follow-up with your primary care provider for further evaluation as needed.  Discussed that your CT does show a lot of stool and you can take the mag citrate to help with that.    Return to the emergency room with any worsening of symptoms

## 2024-10-31 NOTE — ED TRIAGE NOTES
Patient ambulatory with a CC of abdominal pain that started last night. Stated she went to dinner last night and sx started when she got home later that night.     Hx of inguinal hernia.       No hx of abdominal surgery

## 2025-06-13 ENCOUNTER — TRANSCRIBE ORDERS (OUTPATIENT)
Facility: HOSPITAL | Age: 70
End: 2025-06-13

## 2025-06-13 DIAGNOSIS — M54.16 LUMBAR RADICULOPATHY: Primary | ICD-10-CM

## 2025-06-26 ENCOUNTER — HOSPITAL ENCOUNTER (OUTPATIENT)
Facility: HOSPITAL | Age: 70
Discharge: HOME | End: 2025-06-29
Attending: PHYSICAL MEDICINE & REHABILITATION
Payer: MEDICARE

## 2025-06-26 DIAGNOSIS — M54.16 LUMBAR RADICULOPATHY: ICD-10-CM

## 2025-06-26 PROCEDURE — 72148 MRI LUMBAR SPINE W/O DYE: CPT

## 2025-09-07 ENCOUNTER — TRANSCRIBE ORDERS (OUTPATIENT)
Facility: HOSPITAL | Age: 70
End: 2025-09-07

## 2025-09-07 DIAGNOSIS — Z12.31 ENCOUNTER FOR SCREENING MAMMOGRAM FOR MALIGNANT NEOPLASM OF BREAST: Primary | ICD-10-CM

## (undated) DEVICE — (D)AGENT INJECTN ELEVIEW 10ML -- DISC BY MFG

## (undated) DEVICE — Device: Brand: SINGLE USE INJECTOR NM600/610

## (undated) DEVICE — REM POLYHESIVE ADULT PATIENT RETURN ELECTRODE: Brand: VALLEYLAB

## (undated) DEVICE — TRAP SURG QUAD PARABOLA SLOT DSGN SFTY SCRN TRAPEASE

## (undated) DEVICE — SNARE ENDOSCP M L240CM W27MM SHTH DIA2.4MM CHN 2.8MM HEX